# Patient Record
Sex: FEMALE | Race: ASIAN | NOT HISPANIC OR LATINO | Employment: FULL TIME | URBAN - METROPOLITAN AREA
[De-identification: names, ages, dates, MRNs, and addresses within clinical notes are randomized per-mention and may not be internally consistent; named-entity substitution may affect disease eponyms.]

---

## 2022-06-01 LAB
ABO + RH BLD: NORMAL
HCT VFR BLD AUTO: 33 % (ref 36–46)
HGB BLD-MCNC: 11 G/DL (ref 12–16)
INDIRECT COOMBS: NORMAL
PLATELET # BLD AUTO: 199 K/UL (ref 150–399)

## 2022-06-28 LAB
C TRACH RRNA SPEC QL PROBE: NORMAL
HBV SURFACE AG SERPL QL IA: POSITIVE
HIV 1+2 AB+HIV1 P24 AG SERPL QL IA: NORMAL
N GONORRHOEAE, AMPLIFIED DNA: NORMAL
RUBELLA IMMUNE STATUS: NORMAL

## 2022-09-22 ENCOUNTER — INITIAL PRENATAL (OUTPATIENT)
Dept: OBSTETRICS AND GYNECOLOGY | Facility: CLINIC | Age: 32
End: 2022-09-22
Payer: COMMERCIAL

## 2022-09-22 VITALS — DIASTOLIC BLOOD PRESSURE: 64 MMHG | SYSTOLIC BLOOD PRESSURE: 98 MMHG | HEART RATE: 104 BPM | WEIGHT: 136 LBS

## 2022-09-22 DIAGNOSIS — B19.10 HEP B COMPLICATING PREGNANCY: ICD-10-CM

## 2022-09-22 DIAGNOSIS — Z3A.26 26 WEEKS GESTATION OF PREGNANCY: Primary | ICD-10-CM

## 2022-09-22 DIAGNOSIS — O35.EXX0 ENCOUNTER FOR REPEAT ULTRASOUND OF FETAL PYELECTASIS, ANTEPARTUM, SINGLE OR UNSPECIFIED FETUS: ICD-10-CM

## 2022-09-22 DIAGNOSIS — O98.419 HEP B COMPLICATING PREGNANCY: ICD-10-CM

## 2022-09-22 DIAGNOSIS — A60.00 GENITAL HERPES SIMPLEX, UNSPECIFIED SITE: ICD-10-CM

## 2022-09-22 PROBLEM — Z34.90 PREGNANT: Status: ACTIVE | Noted: 2022-09-22

## 2022-09-22 PROCEDURE — 0502F SUBSEQUENT PRENATAL CARE: CPT | Mod: CPTII,S$GLB,, | Performed by: OBSTETRICS & GYNECOLOGY

## 2022-09-22 PROCEDURE — 99999 PR PBB SHADOW E&M-NEW PATIENT-LVL II: CPT | Mod: PBBFAC,,, | Performed by: OBSTETRICS & GYNECOLOGY

## 2022-09-22 PROCEDURE — 0502F PR SUBSEQUENT PRENATAL CARE: ICD-10-PCS | Mod: CPTII,S$GLB,, | Performed by: OBSTETRICS & GYNECOLOGY

## 2022-09-22 PROCEDURE — 99999 PR PBB SHADOW E&M-NEW PATIENT-LVL II: ICD-10-PCS | Mod: PBBFAC,,, | Performed by: OBSTETRICS & GYNECOLOGY

## 2022-09-22 RX ORDER — VALACYCLOVIR HYDROCHLORIDE 500 MG/1
500 TABLET, FILM COATED ORAL 2 TIMES DAILY
Qty: 10 TABLET | Refills: 12 | Status: SHIPPED | OUTPATIENT
Start: 2022-09-22 | End: 2022-11-20

## 2022-09-22 NOTE — PROGRESS NOTES
TRANSFER FROM [place].HAD HSV OUTBREAK LAST WEEK. HAS HEP B.  NP 3 WEEKS / ME 5 WEEKS / TDAP / MFM SCAN / DM / CBC / CMP / HEP B QUANT

## 2022-09-26 ENCOUNTER — PATIENT MESSAGE (OUTPATIENT)
Dept: MATERNAL FETAL MEDICINE | Facility: CLINIC | Age: 32
End: 2022-09-26
Payer: COMMERCIAL

## 2022-09-27 ENCOUNTER — PATIENT MESSAGE (OUTPATIENT)
Dept: MATERNAL FETAL MEDICINE | Facility: CLINIC | Age: 32
End: 2022-09-27
Payer: COMMERCIAL

## 2022-09-28 ENCOUNTER — OFFICE VISIT (OUTPATIENT)
Dept: MATERNAL FETAL MEDICINE | Facility: CLINIC | Age: 32
End: 2022-09-28
Payer: COMMERCIAL

## 2022-09-28 ENCOUNTER — PROCEDURE VISIT (OUTPATIENT)
Dept: MATERNAL FETAL MEDICINE | Facility: CLINIC | Age: 32
End: 2022-09-28
Payer: COMMERCIAL

## 2022-09-28 DIAGNOSIS — Z36.89 ENCOUNTER FOR ULTRASOUND TO ASSESS FETAL GROWTH: Primary | ICD-10-CM

## 2022-09-28 DIAGNOSIS — O35.EXX0 ENCOUNTER FOR REPEAT ULTRASOUND OF FETAL PYELECTASIS, ANTEPARTUM, SINGLE OR UNSPECIFIED FETUS: ICD-10-CM

## 2022-09-28 DIAGNOSIS — Z36.2 ENCOUNTER FOR FOLLOW-UP ULTRASOUND OF FETAL ANATOMY: ICD-10-CM

## 2022-09-28 PROBLEM — O40.9XX0 POLYHYDRAMNIOS, ANTEPARTUM: Status: ACTIVE | Noted: 2022-09-28

## 2022-09-28 PROCEDURE — 99214 PR OFFICE/OUTPT VISIT, EST, LEVL IV, 30-39 MIN: ICD-10-PCS | Mod: 25,S$GLB,, | Performed by: OBSTETRICS & GYNECOLOGY

## 2022-09-28 PROCEDURE — 76811 OB US DETAILED SNGL FETUS: CPT | Mod: S$GLB,,, | Performed by: OBSTETRICS & GYNECOLOGY

## 2022-09-28 PROCEDURE — 76811 US MFM PROCEDURE (VIEWPOINT): ICD-10-PCS | Mod: S$GLB,,, | Performed by: OBSTETRICS & GYNECOLOGY

## 2022-09-28 PROCEDURE — 99999 PR PBB SHADOW E&M-EST. PATIENT-LVL I: CPT | Mod: PBBFAC,,, | Performed by: OBSTETRICS & GYNECOLOGY

## 2022-09-28 PROCEDURE — 99999 PR PBB SHADOW E&M-EST. PATIENT-LVL I: ICD-10-PCS | Mod: PBBFAC,,, | Performed by: OBSTETRICS & GYNECOLOGY

## 2022-09-28 PROCEDURE — 99214 OFFICE O/P EST MOD 30 MIN: CPT | Mod: 25,S$GLB,, | Performed by: OBSTETRICS & GYNECOLOGY

## 2022-09-28 NOTE — ASSESSMENT & PLAN NOTE
Identified on 20w anatomy US, R 6.3 mm and L 5.8 mm at [facility] ([facility])  27w US with R 7.4 mm and L 5.4 mm, consistent with UTD A2-3, without evidence of calyceal involvement  Normal chromosomal screening results through [place] [facility] per records from [facility](unclear which specific test)    Based on the prenatal rating system proposed by Kiarra et al in the Journal of Pediatric Urology (2014), we grade urinary tract dilation in a manner that correlates with long-term risk (UTD A1 or A2-3). By this criteria, her measurement of 7.4 mm without other abnormal findings puts her fetus in the moderate risk category, A2-3. The cause of urinary tract dilation is most often physiologic, particularly for fetuses in the low risk category.  Other causes can include anatomic obstructions, with a prevalence ranging between 5 and 30% of diagnosed fetuses. There is a small association with trisomy 21, with a relative risk of 1.6, though this does not meaningfully modify her negative chromosome screening to date.    Recommendations:  1. Follow up US at 32 weeks  -  follow-up will depend on that measurement.  If the dilation persists, most often neonates are followed up in the first few weeks of life.  2. Fetal UTD does not typically alter place, timing or mode of delivery

## 2022-09-28 NOTE — PROGRESS NOTES
MATERNAL-FETAL MEDICINE   CONSULT NOTE    Provider requesting consultation: Dr. Kent    SUBJECTIVE:     Ms. Michelle Hernandez is a 32 y.o.  female with IUP at 27w1d who is seen in consultation by MFM for evaluation and management of:  Problem   Fetal pyelectasis          Medication List with Changes/Refills   Current Medications    VALACYCLOVIR (VALTREX) 500 MG TABLET    Take 1 tablet (500 mg total) by mouth 2 (two) times daily. for 5 days     Review of patient's allergies indicates:  No Known Allergies    PMH:  Past Medical History:   Diagnosis Date    Abnormal Pap smear of cervix     Herpes simplex virus (HSV) infection        PObHx:  OB History    Para Term  AB Living   2             SAB IAB Ectopic Multiple Live Births                  # Outcome Date GA Lbr Hang/2nd Weight Sex Delivery Anes PTL Lv   2 Current            1                 PSH:No past surgical history.    Family history:family history includes Colon cancer in her maternal grandmother.    Social history: reports that she has never smoked. She has never used smokeless tobacco. She reports that she does not currently use alcohol. She reports that she does not use drugs.    Genetic history: The patient denies any inherited genetic diseases or birth defects in herself or her partner's personal history or family.    Objective:   LMP 2022     Ultrasound performed. See viewpoint for full ultrasound report.  1. A routine anomaly screen was extended to a detailed anatomy survey secondary to the following finding: bilateral UTD    2. Bilateral renal pelvis dilation was identified on ultrasound today, measuring 7.3 mm (right) and 5.4 mm (left) and consistent with UTD A2-3 (moderate risk). This is a common and typically benign finding during the second trimester, occurring in 1 in 20 fetuses. However, it may herald the onset of true renal tract obstruction or reflux. Follow-up sonography at 32 - 34 weeks is recommended to determine need  for   evaluation. Targeted fetal anatomic views reveal no other fetal abnormalities though fetal imaging remains incomplete. Neither major structural abnormalities nor soft markers associated with Down Syndrome were seen. The patient is at low risk for Down Syndrome based on negative reported screening. Because the association between isolated renal pelvis dilation and Down Syndrome is weak or uncertain, no further testing for Down Syndrome is recommended. No counseling regarding renal pelvis dilation and Down Syndrome was performed today.   3. Fetal size today is consistent with established gestational age.  4. Cervical length is normal.  5. Placental location is anterior without evidence of previa.  6. Mild polyhydramnios (DANNIELLE < 30 cm) is identified on today's exam. Mild polyhydramnios is generally not an indicator of a significant abnormality of amniotic fluid volume.     Significant labs/imaging:  Aneuploidy screening: negative per [facility]    ASSESSMENT/PLAN:     32 y.o.  female with IUP at 27w1d     Fetal pyelectasis  Identified on 20w anatomy US, R 6.3 mm and L 5.8 mm at [facility] ([facility])  27w US with R 7.4 mm and L 5.4 mm, consistent with UTD A2-3, without evidence of calyceal involvement  Normal chromosomal screening results through [place] [facility] per records from [facility](unclear which specific test)    Based on the prenatal rating system proposed by Kiarra et al in the Journal of Pediatric Urology (2014), we grade urinary tract dilation in a manner that correlates with long-term risk (UTD A1 or A2-3). By this criteria, her measurement of 7.4 mm without other abnormal findings puts her fetus in the moderate risk category, A2-3. The cause of urinary tract dilation is most often physiologic, particularly for fetuses in the low risk category.  Other causes can include anatomic obstructions, with a prevalence ranging between 5 and 30% of diagnosed fetuses. There is  a small association with trisomy 21, with a relative risk of 1.6, though this does not meaningfully modify her negative chromosome screening to date.    Recommendations:  1. Follow up US at 32 weeks  -  follow-up will depend on that measurement.  If the dilation persists, most often neonates are followed up in the first few weeks of life.  2. Fetal UTD does not typically alter place, timing or mode of delivery    Patient's other comorbidites are chronic hepatitis B and HSV.  These were not addressed in today's visit.  If primary OB provider would like MFM input on these, please feel free to reconsult as clinically indicated.  Otherwise, will defer management to primary OB provider      FOLLOW UP:   F/u in 4 weeks for US  No further MFM visits were scheduled        Vladimir Silverman III  Maternal-Fetal Medicine    Electronically Signed by Vladimir Silverman III 2022

## 2022-10-04 ENCOUNTER — PATIENT MESSAGE (OUTPATIENT)
Dept: ADMINISTRATIVE | Facility: OTHER | Age: 32
End: 2022-10-04
Payer: COMMERCIAL

## 2022-10-14 ENCOUNTER — CLINICAL SUPPORT (OUTPATIENT)
Dept: OBSTETRICS AND GYNECOLOGY | Facility: CLINIC | Age: 32
End: 2022-10-14
Payer: COMMERCIAL

## 2022-10-14 ENCOUNTER — PATIENT MESSAGE (OUTPATIENT)
Dept: OBSTETRICS AND GYNECOLOGY | Facility: CLINIC | Age: 32
End: 2022-10-14

## 2022-10-14 ENCOUNTER — LAB VISIT (OUTPATIENT)
Dept: LAB | Facility: OTHER | Age: 32
End: 2022-10-14
Attending: OBSTETRICS & GYNECOLOGY
Payer: COMMERCIAL

## 2022-10-14 ENCOUNTER — ROUTINE PRENATAL (OUTPATIENT)
Dept: OBSTETRICS AND GYNECOLOGY | Facility: CLINIC | Age: 32
End: 2022-10-14
Payer: COMMERCIAL

## 2022-10-14 VITALS
BODY MASS INDEX: 26.55 KG/M2 | WEIGHT: 140.63 LBS | DIASTOLIC BLOOD PRESSURE: 62 MMHG | HEIGHT: 61 IN | SYSTOLIC BLOOD PRESSURE: 104 MMHG

## 2022-10-14 DIAGNOSIS — Z23 NEED FOR TDAP VACCINATION: Primary | ICD-10-CM

## 2022-10-14 DIAGNOSIS — Z3A.29 29 WEEKS GESTATION OF PREGNANCY: Primary | ICD-10-CM

## 2022-10-14 DIAGNOSIS — Z3A.26 26 WEEKS GESTATION OF PREGNANCY: ICD-10-CM

## 2022-10-14 LAB
ALBUMIN SERPL BCP-MCNC: 2.7 G/DL (ref 3.5–5.2)
ALP SERPL-CCNC: 74 U/L (ref 55–135)
ALT SERPL W/O P-5'-P-CCNC: 21 U/L (ref 10–44)
ANION GAP SERPL CALC-SCNC: 3 MMOL/L (ref 8–16)
AST SERPL-CCNC: 22 U/L (ref 10–40)
BASOPHILS # BLD AUTO: 0.03 K/UL (ref 0–0.2)
BASOPHILS NFR BLD: 0.3 % (ref 0–1.9)
BILIRUB SERPL-MCNC: 0.2 MG/DL (ref 0.1–1)
BUN SERPL-MCNC: 5 MG/DL (ref 6–20)
CALCIUM SERPL-MCNC: 8.2 MG/DL (ref 8.7–10.5)
CHLORIDE SERPL-SCNC: 108 MMOL/L (ref 95–110)
CO2 SERPL-SCNC: 24 MMOL/L (ref 23–29)
CREAT SERPL-MCNC: 0.6 MG/DL (ref 0.5–1.4)
DIFFERENTIAL METHOD: ABNORMAL
EOSINOPHIL # BLD AUTO: 0.1 K/UL (ref 0–0.5)
EOSINOPHIL NFR BLD: 1.4 % (ref 0–8)
ERYTHROCYTE [DISTWIDTH] IN BLOOD BY AUTOMATED COUNT: 12.6 % (ref 11.5–14.5)
EST. GFR  (NO RACE VARIABLE): >60 ML/MIN/1.73 M^2
GLUCOSE SERPL-MCNC: 120 MG/DL (ref 70–110)
GLUCOSE SERPL-MCNC: 120 MG/DL (ref 70–140)
HCT VFR BLD AUTO: 28.9 % (ref 37–48.5)
HGB BLD-MCNC: 9.9 G/DL (ref 12–16)
IMM GRANULOCYTES # BLD AUTO: 0.08 K/UL (ref 0–0.04)
IMM GRANULOCYTES NFR BLD AUTO: 0.9 % (ref 0–0.5)
LYMPHOCYTES # BLD AUTO: 1.7 K/UL (ref 1–4.8)
LYMPHOCYTES NFR BLD: 18.8 % (ref 18–48)
MCH RBC QN AUTO: 34.4 PG (ref 27–31)
MCHC RBC AUTO-ENTMCNC: 34.3 G/DL (ref 32–36)
MCV RBC AUTO: 100 FL (ref 82–98)
MONOCYTES # BLD AUTO: 0.9 K/UL (ref 0.3–1)
MONOCYTES NFR BLD: 10.4 % (ref 4–15)
NEUTROPHILS # BLD AUTO: 6.1 K/UL (ref 1.8–7.7)
NEUTROPHILS NFR BLD: 68.2 % (ref 38–73)
NRBC BLD-RTO: 0 /100 WBC
PLATELET # BLD AUTO: 198 K/UL (ref 150–450)
PMV BLD AUTO: 9.6 FL (ref 9.2–12.9)
POTASSIUM SERPL-SCNC: 3.6 MMOL/L (ref 3.5–5.1)
PROT SERPL-MCNC: 6.1 G/DL (ref 6–8.4)
RBC # BLD AUTO: 2.88 M/UL (ref 4–5.4)
SODIUM SERPL-SCNC: 135 MMOL/L (ref 136–145)
WBC # BLD AUTO: 9.01 K/UL (ref 3.9–12.7)

## 2022-10-14 PROCEDURE — 90715 TDAP VACCINE GREATER THAN OR EQUAL TO 7YO IM: ICD-10-PCS | Mod: S$GLB,,, | Performed by: OBSTETRICS & GYNECOLOGY

## 2022-10-14 PROCEDURE — 80053 COMPREHEN METABOLIC PANEL: CPT | Performed by: OBSTETRICS & GYNECOLOGY

## 2022-10-14 PROCEDURE — 99999 PR PBB SHADOW E&M-EST. PATIENT-LVL I: CPT | Mod: PBBFAC,,,

## 2022-10-14 PROCEDURE — 0502F SUBSEQUENT PRENATAL CARE: CPT | Mod: CPTII,S$GLB,, | Performed by: NURSE PRACTITIONER

## 2022-10-14 PROCEDURE — 0502F PR SUBSEQUENT PRENATAL CARE: ICD-10-PCS | Mod: CPTII,S$GLB,, | Performed by: NURSE PRACTITIONER

## 2022-10-14 PROCEDURE — 99999 PR PBB SHADOW E&M-EST. PATIENT-LVL I: ICD-10-PCS | Mod: PBBFAC,,,

## 2022-10-14 PROCEDURE — 90715 TDAP VACCINE 7 YRS/> IM: CPT | Mod: S$GLB,,, | Performed by: OBSTETRICS & GYNECOLOGY

## 2022-10-14 PROCEDURE — 36415 COLL VENOUS BLD VENIPUNCTURE: CPT | Performed by: OBSTETRICS & GYNECOLOGY

## 2022-10-14 PROCEDURE — 82950 GLUCOSE TEST: CPT | Performed by: OBSTETRICS & GYNECOLOGY

## 2022-10-14 PROCEDURE — 99999 PR PBB SHADOW E&M-EST. PATIENT-LVL III: ICD-10-PCS | Mod: PBBFAC,,, | Performed by: NURSE PRACTITIONER

## 2022-10-14 PROCEDURE — 90471 IMMUNIZATION ADMIN: CPT | Mod: S$GLB,,, | Performed by: OBSTETRICS & GYNECOLOGY

## 2022-10-14 PROCEDURE — 90471 TDAP VACCINE GREATER THAN OR EQUAL TO 7YO IM: ICD-10-PCS | Mod: S$GLB,,, | Performed by: OBSTETRICS & GYNECOLOGY

## 2022-10-14 PROCEDURE — 85025 COMPLETE CBC W/AUTO DIFF WBC: CPT | Performed by: OBSTETRICS & GYNECOLOGY

## 2022-10-14 PROCEDURE — 99999 PR PBB SHADOW E&M-EST. PATIENT-LVL III: CPT | Mod: PBBFAC,,, | Performed by: NURSE PRACTITIONER

## 2022-10-14 NOTE — PATIENT INSTRUCTIONS
LABOR AND DELIVERY PHONE NUMBER, [phone] (OPEN 24/7, LOCATED ON 6TH FLOOR OF HOSPITAL)  [address] PHONE NUMBER, [phone] (OPEN MON-FRI, 8a-5p)

## 2022-10-14 NOTE — PROGRESS NOTES
Here for routine OB appt at 29w3d, with no complaints. Feels good. Having a girl- name undecided. Interested in hospital tour. Lives close to [facility]. Questions about due date- us here says 12/27, but it was always 12/28/22. Reports good FM.  Denies LOF, denies VB, denies contractions.  Reviewed warning signs of Labor and Preeclampsia.  Daily FM counts reinforced.  F/U scheduled 2 weeks  Tdap and glucose today  Rh positive   Gave [facility] peds list and prenatal classes information  Reviewed [facility] location and phone number  Plans to breast feed

## 2022-10-18 PROBLEM — D64.9 ANEMIA: Status: ACTIVE | Noted: 2022-10-18

## 2022-10-20 ENCOUNTER — ROUTINE PRENATAL (OUTPATIENT)
Dept: OBSTETRICS AND GYNECOLOGY | Facility: CLINIC | Age: 32
End: 2022-10-20
Payer: COMMERCIAL

## 2022-10-20 VITALS — SYSTOLIC BLOOD PRESSURE: 93 MMHG | BODY MASS INDEX: 26.66 KG/M2 | WEIGHT: 141.13 LBS | DIASTOLIC BLOOD PRESSURE: 56 MMHG

## 2022-10-20 DIAGNOSIS — Z3A.30 PREGNANCY WITH 30 COMPLETED WEEKS GESTATION: Primary | ICD-10-CM

## 2022-10-20 PROCEDURE — 99999 PR PBB SHADOW E&M-EST. PATIENT-LVL II: CPT | Mod: PBBFAC,,, | Performed by: OBSTETRICS & GYNECOLOGY

## 2022-10-20 PROCEDURE — 0502F SUBSEQUENT PRENATAL CARE: CPT | Mod: CPTII,S$GLB,, | Performed by: OBSTETRICS & GYNECOLOGY

## 2022-10-20 PROCEDURE — 99999 PR PBB SHADOW E&M-EST. PATIENT-LVL II: ICD-10-PCS | Mod: PBBFAC,,, | Performed by: OBSTETRICS & GYNECOLOGY

## 2022-10-20 PROCEDURE — 0502F PR SUBSEQUENT PRENATAL CARE: ICD-10-PCS | Mod: CPTII,S$GLB,, | Performed by: OBSTETRICS & GYNECOLOGY

## 2022-10-31 ENCOUNTER — TELEPHONE (OUTPATIENT)
Dept: OBSTETRICS AND GYNECOLOGY | Facility: CLINIC | Age: 32
End: 2022-10-31
Payer: COMMERCIAL

## 2022-10-31 NOTE — TELEPHONE ENCOUNTER
Spoke with pt in regards to appt.     ----- Message from Nathaniel Torres RN sent at 10/31/2022  2:47 PM CDT -----  Patient is scheduled for 11/7  ----- Message -----  From: Belkis Canseco MA  Sent: 10/31/2022   1:34 PM CDT  To: [facility] Maternal Fetal Medicine Staff    Hi-    Do you gubrad have an available time on 11/7?    Thank you,  Belkis     ----- Message -----  From: Lorne Hill  Sent: 10/31/2022  12:17 PM CDT  To: [facility] Staff    Name of Who is Calling: CHERRIE BURNS [[id number]]           What is the request in detail: Patient is requesting a call back to reschedule upcoming ob appointment and ultrasound to Monday November 7.  Please assist.           Can the clinic reply by [facility]: NO           What Number to Call Back if not in [facility]: [phone]

## 2022-11-02 ENCOUNTER — PATIENT MESSAGE (OUTPATIENT)
Dept: ADMINISTRATIVE | Facility: OTHER | Age: 32
End: 2022-11-02
Payer: COMMERCIAL

## 2022-11-04 ENCOUNTER — PATIENT MESSAGE (OUTPATIENT)
Dept: MATERNAL FETAL MEDICINE | Facility: CLINIC | Age: 32
End: 2022-11-04
Payer: COMMERCIAL

## 2022-11-07 ENCOUNTER — PROCEDURE VISIT (OUTPATIENT)
Dept: MATERNAL FETAL MEDICINE | Facility: CLINIC | Age: 32
End: 2022-11-07
Payer: COMMERCIAL

## 2022-11-07 DIAGNOSIS — Z36.2 ENCOUNTER FOR FOLLOW-UP ULTRASOUND OF FETAL ANATOMY: ICD-10-CM

## 2022-11-07 DIAGNOSIS — O35.EXX0 ENCOUNTER FOR REPEAT ULTRASOUND OF FETAL PYELECTASIS, ANTEPARTUM, SINGLE OR UNSPECIFIED FETUS: ICD-10-CM

## 2022-11-07 DIAGNOSIS — Z36.89 ENCOUNTER FOR ULTRASOUND TO ASSESS FETAL GROWTH: ICD-10-CM

## 2022-11-07 DIAGNOSIS — Z3A.32 32 WEEKS GESTATION OF PREGNANCY: Primary | ICD-10-CM

## 2022-11-07 PROCEDURE — 76816 OB US FOLLOW-UP PER FETUS: CPT | Mod: S$GLB,,, | Performed by: OBSTETRICS & GYNECOLOGY

## 2022-11-07 PROCEDURE — 76816 PR  US,PREGNANT UTERUS,F/U,TRANSABD APP: ICD-10-PCS | Mod: S$GLB,,, | Performed by: OBSTETRICS & GYNECOLOGY

## 2022-11-14 ENCOUNTER — ROUTINE PRENATAL (OUTPATIENT)
Dept: OBSTETRICS AND GYNECOLOGY | Facility: CLINIC | Age: 32
End: 2022-11-14
Payer: COMMERCIAL

## 2022-11-14 VITALS — DIASTOLIC BLOOD PRESSURE: 66 MMHG | WEIGHT: 142 LBS | BODY MASS INDEX: 26.83 KG/M2 | SYSTOLIC BLOOD PRESSURE: 108 MMHG

## 2022-11-14 DIAGNOSIS — Z3A.33 33 WEEKS GESTATION OF PREGNANCY: Primary | ICD-10-CM

## 2022-11-14 DIAGNOSIS — L29.9 ITCHING: ICD-10-CM

## 2022-11-14 PROCEDURE — 99999 PR PBB SHADOW E&M-EST. PATIENT-LVL II: ICD-10-PCS | Mod: PBBFAC,,,

## 2022-11-14 PROCEDURE — 0502F PR SUBSEQUENT PRENATAL CARE: ICD-10-PCS | Mod: CPTII,S$GLB,,

## 2022-11-14 PROCEDURE — 99999 PR PBB SHADOW E&M-EST. PATIENT-LVL II: CPT | Mod: PBBFAC,,,

## 2022-11-14 PROCEDURE — 0502F SUBSEQUENT PRENATAL CARE: CPT | Mod: CPTII,S$GLB,,

## 2022-11-14 NOTE — PROGRESS NOTES
Here for routine OB appt at 33w5d, with no complaints. Reports good FM. Denies LOF, denies VB, denies contractions. Does report increased itching of her abdomen for the past month. States she's tried hydrocortisone cream without much relief. Denies itching of hands and feet. Labs ordered.   Discussed [facility] pediatricians.   Follow up ultrasound scheduled.   Reviewed warning signs of Labor and Preeclampsia. Daily FM counts reinforced.  F/U scheduled 2 weeks with Dr. Kent

## 2022-11-16 ENCOUNTER — LAB VISIT (OUTPATIENT)
Dept: LAB | Facility: OTHER | Age: 32
End: 2022-11-16
Attending: OBSTETRICS & GYNECOLOGY
Payer: COMMERCIAL

## 2022-11-16 DIAGNOSIS — O98.419 HEP B COMPLICATING PREGNANCY: ICD-10-CM

## 2022-11-16 DIAGNOSIS — B19.10 HEP B COMPLICATING PREGNANCY: ICD-10-CM

## 2022-11-16 DIAGNOSIS — L29.9 ITCHING: ICD-10-CM

## 2022-11-16 LAB
ALBUMIN SERPL BCP-MCNC: 2.8 G/DL (ref 3.5–5.2)
ALP SERPL-CCNC: 103 U/L (ref 55–135)
ALT SERPL W/O P-5'-P-CCNC: 15 U/L (ref 10–44)
ANION GAP SERPL CALC-SCNC: 6 MMOL/L (ref 8–16)
AST SERPL-CCNC: 21 U/L (ref 10–40)
BILIRUB SERPL-MCNC: 0.3 MG/DL (ref 0.1–1)
BUN SERPL-MCNC: 5 MG/DL (ref 6–20)
CALCIUM SERPL-MCNC: 8.6 MG/DL (ref 8.7–10.5)
CHLORIDE SERPL-SCNC: 107 MMOL/L (ref 95–110)
CO2 SERPL-SCNC: 24 MMOL/L (ref 23–29)
CREAT SERPL-MCNC: 0.6 MG/DL (ref 0.5–1.4)
EST. GFR  (NO RACE VARIABLE): >60 ML/MIN/1.73 M^2
GLUCOSE SERPL-MCNC: 69 MG/DL (ref 70–110)
POTASSIUM SERPL-SCNC: 3.8 MMOL/L (ref 3.5–5.1)
PROT SERPL-MCNC: 6.2 G/DL (ref 6–8.4)
SODIUM SERPL-SCNC: 137 MMOL/L (ref 136–145)

## 2022-11-16 PROCEDURE — 82239 BILE ACIDS TOTAL: CPT

## 2022-11-16 PROCEDURE — 36415 COLL VENOUS BLD VENIPUNCTURE: CPT

## 2022-11-16 PROCEDURE — 87517 HEPATITIS B DNA QUANT: CPT | Performed by: OBSTETRICS & GYNECOLOGY

## 2022-11-16 PROCEDURE — 80053 COMPREHEN METABOLIC PANEL: CPT

## 2022-11-18 ENCOUNTER — PATIENT MESSAGE (OUTPATIENT)
Dept: OBSTETRICS AND GYNECOLOGY | Facility: CLINIC | Age: 32
End: 2022-11-18

## 2022-11-18 ENCOUNTER — OFFICE VISIT (OUTPATIENT)
Dept: OBSTETRICS AND GYNECOLOGY | Facility: CLINIC | Age: 32
End: 2022-11-18
Payer: COMMERCIAL

## 2022-11-18 VITALS
DIASTOLIC BLOOD PRESSURE: 56 MMHG | BODY MASS INDEX: 27.72 KG/M2 | WEIGHT: 146.81 LBS | HEIGHT: 61 IN | SYSTOLIC BLOOD PRESSURE: 98 MMHG

## 2022-11-18 DIAGNOSIS — Z3A.34 34 WEEKS GESTATION OF PREGNANCY: ICD-10-CM

## 2022-11-18 DIAGNOSIS — B00.9 HSV INFECTION: Primary | ICD-10-CM

## 2022-11-18 LAB — BILE AC SERPL-SCNC: 10 MCMOL/L

## 2022-11-18 PROCEDURE — 99214 OFFICE O/P EST MOD 30 MIN: CPT | Mod: 24,S$GLB,,

## 2022-11-18 PROCEDURE — 99214 PR OFFICE/OUTPT VISIT, EST, LEVL IV, 30-39 MIN: ICD-10-PCS | Mod: 24,S$GLB,,

## 2022-11-18 PROCEDURE — 99999 PR PBB SHADOW E&M-EST. PATIENT-LVL III: ICD-10-PCS | Mod: PBBFAC,,,

## 2022-11-18 PROCEDURE — 99999 PR PBB SHADOW E&M-EST. PATIENT-LVL III: CPT | Mod: PBBFAC,,,

## 2022-11-18 NOTE — PROGRESS NOTES
"CC: vaginal rash     HPI:  Michelle Hernandez is a 32 y.o. female  @ 34w2d presents with complaint of vaginal rash for the past few days. Pt does have a hx of HSV. States that the rash is burning with some itching. Cannot fully see if it looks similar to HSV. Does report that she's had shingles in the past. Is wanting to get the shingles vaccine.     Past Medical History:   Diagnosis Date    Abnormal Pap smear of cervix     Herpes simplex virus (HSV) infection      History reviewed. No pertinent surgical history.  Social History     Tobacco Use    Smoking status: Never    Smokeless tobacco: Never   Substance Use Topics    Alcohol use: Not Currently    Drug use: Never     Family History   Problem Relation Age of Onset    Colon cancer Maternal Grandmother     Breast cancer Neg Hx     Ovarian cancer Neg Hx      OB History    Para Term  AB Living   2             SAB IAB Ectopic Multiple Live Births                  # Outcome Date GA Lbr Hang/2nd Weight Sex Delivery Anes PTL Lv   2 Current            1                 BP (!) 98/56 (BP Location: Right arm, Patient Position: Sitting)   Ht 5' 1" (1.549 m)   Wt 66.6 kg (146 lb 13.2 oz)   LMP 2022   BMI 27.74 kg/m²     ROS:  GENERAL: No fever, chills, fatigability or weight loss.  MONS PUBIS: reports itching and burning   VULVAR: No pain   VAGINAL: No relaxation, no itching, no discharge, no abnormal bleeding and no lesions.  ABDOMEN: No abdominal pain. Denies nausea. Denies vomiting. No diarrhea. No constipation  BREAST: Denies pain. No lumps. No discharge.  URINARY: No incontinence, no nocturia, no frequency and no dysuria.  CARDIOVASCULAR: No chest pain. No shortness of breath. No leg cramps.  NEUROLOGICAL: No headaches. No vision changes.    PHYSICAL EXAM:  MONS PUBIS: Small 1 cm outbreak of HSV noted   VULVA: normal appearing vulva with no masses, tenderness or lesions   VAGINA: normal appearing vagina with normal color.     ASSESSMENT and " PLAN:    ICD-10-CM ICD-9-CM    1. HSV infection  B00.9 054.9       2. 34 weeks gestation of pregnancy  Z3A.34 V22.2         - Discussed that the rash is HSV   - Instructed patient to start taking Valtrex RX  - Already has RX for valtrex at pharmacy, just needs to  refill     FOLLOW UP: PRN lack of improvement.    Rita Roa, NP-C  [facility]

## 2022-11-20 ENCOUNTER — PATIENT MESSAGE (OUTPATIENT)
Dept: OBSTETRICS AND GYNECOLOGY | Facility: CLINIC | Age: 32
End: 2022-11-20
Payer: COMMERCIAL

## 2022-11-20 RX ORDER — VALACYCLOVIR HYDROCHLORIDE 500 MG/1
500 TABLET, FILM COATED ORAL 2 TIMES DAILY
Qty: 10 TABLET | Refills: 12 | Status: SHIPPED | OUTPATIENT
Start: 2022-11-20 | End: 2022-12-28

## 2022-11-21 LAB
HBV DNA SERPL NAA+PROBE-ACNC: 57 IU/ML
HBV DNA SERPL NAA+PROBE-LOG IU: 1.76 LOG (10) IU/ML
HBV DNA SERPL QL NAA+PROBE: DETECTED

## 2022-11-29 ENCOUNTER — TELEPHONE (OUTPATIENT)
Dept: OBSTETRICS AND GYNECOLOGY | Facility: CLINIC | Age: 32
End: 2022-11-29
Payer: COMMERCIAL

## 2022-11-29 NOTE — TELEPHONE ENCOUNTER
----- Message from Ronna Kumari sent at 11/29/2022 10:18 AM CST -----  Pt called in to speak with the nurse in regards to her appointment today. The caller states she was asked to come in early , but is unable to. The pt declined to see a np or another provider. Pls call and advise.

## 2022-12-06 ENCOUNTER — LAB VISIT (OUTPATIENT)
Dept: LAB | Facility: OTHER | Age: 32
End: 2022-12-06
Payer: COMMERCIAL

## 2022-12-06 ENCOUNTER — ROUTINE PRENATAL (OUTPATIENT)
Dept: OBSTETRICS AND GYNECOLOGY | Facility: CLINIC | Age: 32
End: 2022-12-06
Payer: COMMERCIAL

## 2022-12-06 ENCOUNTER — PROCEDURE VISIT (OUTPATIENT)
Dept: MATERNAL FETAL MEDICINE | Facility: CLINIC | Age: 32
End: 2022-12-06
Payer: COMMERCIAL

## 2022-12-06 VITALS — BODY MASS INDEX: 27.49 KG/M2 | WEIGHT: 145.5 LBS | SYSTOLIC BLOOD PRESSURE: 96 MMHG | DIASTOLIC BLOOD PRESSURE: 61 MMHG

## 2022-12-06 DIAGNOSIS — Z3A.36 36 WEEKS GESTATION OF PREGNANCY: ICD-10-CM

## 2022-12-06 DIAGNOSIS — Z3A.36 36 WEEKS GESTATION OF PREGNANCY: Primary | ICD-10-CM

## 2022-12-06 DIAGNOSIS — Z36.89 ENCOUNTER FOR ULTRASOUND TO ASSESS FETAL GROWTH: ICD-10-CM

## 2022-12-06 LAB — HIV 1+2 AB+HIV1 P24 AG SERPL QL IA: NORMAL

## 2022-12-06 PROCEDURE — 99999 PR PBB SHADOW E&M-EST. PATIENT-LVL II: ICD-10-PCS | Mod: PBBFAC,,,

## 2022-12-06 PROCEDURE — 86592 SYPHILIS TEST NON-TREP QUAL: CPT

## 2022-12-06 PROCEDURE — 76816 US MFM PROCEDURE (VIEWPOINT): ICD-10-PCS | Mod: S$GLB,,, | Performed by: OBSTETRICS & GYNECOLOGY

## 2022-12-06 PROCEDURE — 87389 HIV-1 AG W/HIV-1&-2 AB AG IA: CPT

## 2022-12-06 PROCEDURE — 99999 PR PBB SHADOW E&M-EST. PATIENT-LVL II: CPT | Mod: PBBFAC,,,

## 2022-12-06 PROCEDURE — 0502F SUBSEQUENT PRENATAL CARE: CPT | Mod: S$GLB,,,

## 2022-12-06 PROCEDURE — 87081 CULTURE SCREEN ONLY: CPT

## 2022-12-06 PROCEDURE — 76816 OB US FOLLOW-UP PER FETUS: CPT | Mod: S$GLB,,, | Performed by: OBSTETRICS & GYNECOLOGY

## 2022-12-06 PROCEDURE — 36415 COLL VENOUS BLD VENIPUNCTURE: CPT

## 2022-12-06 PROCEDURE — 0502F PR SUBSEQUENT PRENATAL CARE: ICD-10-PCS | Mod: S$GLB,,,

## 2022-12-06 NOTE — PROGRESS NOTES
Here for routine OB appt at 36w6d, with no complaints. Reports good FM.  Denies LOF, denies VB, denies contractions. Had HSV outbreak last time I saw her, mostly cleared up. Discussed taking valtrex daily for prophylaxis. Had MFM ultrasound today, results still pending.  - GBS today  - 3T labs today   Reviewed warning signs of Labor and Preeclampsia. Daily FM counts reinforced.  F/U scheduled 1 week with Dr. Kent

## 2022-12-07 LAB — RPR SER QL: NORMAL

## 2022-12-09 LAB — BACTERIA SPEC AEROBE CULT: NORMAL

## 2022-12-12 ENCOUNTER — ROUTINE PRENATAL (OUTPATIENT)
Dept: OBSTETRICS AND GYNECOLOGY | Facility: CLINIC | Age: 32
End: 2022-12-12
Payer: COMMERCIAL

## 2022-12-12 VITALS
BODY MASS INDEX: 27.91 KG/M2 | DIASTOLIC BLOOD PRESSURE: 58 MMHG | WEIGHT: 147.69 LBS | SYSTOLIC BLOOD PRESSURE: 102 MMHG

## 2022-12-12 DIAGNOSIS — Z3A.37 37 WEEKS GESTATION OF PREGNANCY: Primary | ICD-10-CM

## 2022-12-12 PROCEDURE — 99999 PR PBB SHADOW E&M-EST. PATIENT-LVL II: CPT | Mod: PBBFAC,,, | Performed by: OBSTETRICS & GYNECOLOGY

## 2022-12-12 PROCEDURE — 0502F SUBSEQUENT PRENATAL CARE: CPT | Mod: S$GLB,,, | Performed by: OBSTETRICS & GYNECOLOGY

## 2022-12-12 PROCEDURE — 0502F PR SUBSEQUENT PRENATAL CARE: ICD-10-PCS | Mod: S$GLB,,, | Performed by: OBSTETRICS & GYNECOLOGY

## 2022-12-12 PROCEDURE — 99999 PR PBB SHADOW E&M-EST. PATIENT-LVL II: ICD-10-PCS | Mod: PBBFAC,,, | Performed by: OBSTETRICS & GYNECOLOGY

## 2022-12-13 ENCOUNTER — OFFICE VISIT (OUTPATIENT)
Dept: PEDIATRICS | Facility: CLINIC | Age: 32
End: 2022-12-13
Payer: COMMERCIAL

## 2022-12-13 DIAGNOSIS — Z76.81 EXPECTANT PARENT PREBIRTH PEDIATRICIAN VISIT: Primary | ICD-10-CM

## 2022-12-13 PROCEDURE — 99999 PR PBB SHADOW E&M-EST. PATIENT-LVL I: CPT | Mod: PBBFAC,,, | Performed by: PEDIATRICS

## 2022-12-13 PROCEDURE — 99499 UNLISTED E&M SERVICE: CPT | Mod: S$GLB,,, | Performed by: PEDIATRICS

## 2022-12-13 PROCEDURE — 99999 PR PBB SHADOW E&M-EST. PATIENT-LVL I: ICD-10-PCS | Mod: PBBFAC,,, | Performed by: PEDIATRICS

## 2022-12-13 PROCEDURE — 99499 NO LOS: ICD-10-PCS | Mod: S$GLB,,, | Performed by: PEDIATRICS

## 2022-12-13 NOTE — PROGRESS NOTES
CC: Prenatal visit    Due date: 12/28  Gender: Girl  OB: Dr. Kent    Parental concerns or questions today:  general    [facility] practice care reviewed.  All questions answered.

## 2022-12-20 ENCOUNTER — TELEPHONE (OUTPATIENT)
Dept: OBSTETRICS AND GYNECOLOGY | Facility: CLINIC | Age: 32
End: 2022-12-20
Payer: COMMERCIAL

## 2022-12-20 ENCOUNTER — PATIENT MESSAGE (OUTPATIENT)
Dept: OBSTETRICS AND GYNECOLOGY | Facility: CLINIC | Age: 32
End: 2022-12-20
Payer: COMMERCIAL

## 2022-12-20 NOTE — TELEPHONE ENCOUNTER
Spoke with pt and informed her that MD is out ill. Pt made an appt w/ NP and a just incase with MD for next available.

## 2022-12-20 NOTE — TELEPHONE ENCOUNTER
----- Message from Tahira Silverman sent at 12/20/2022  2:00 PM CST -----  Pt would like to see Dr Kent before she go in. Her due date is 12/27. Pt can be reached at [phone]

## 2022-12-21 ENCOUNTER — ROUTINE PRENATAL (OUTPATIENT)
Dept: OBSTETRICS AND GYNECOLOGY | Facility: CLINIC | Age: 32
End: 2022-12-21
Payer: COMMERCIAL

## 2022-12-21 VITALS
SYSTOLIC BLOOD PRESSURE: 112 MMHG | BODY MASS INDEX: 28.33 KG/M2 | DIASTOLIC BLOOD PRESSURE: 69 MMHG | WEIGHT: 149.94 LBS

## 2022-12-21 DIAGNOSIS — Z3A.39 39 WEEKS GESTATION OF PREGNANCY: Primary | ICD-10-CM

## 2022-12-21 PROCEDURE — 99999 PR PBB SHADOW E&M-EST. PATIENT-LVL III: ICD-10-PCS | Mod: PBBFAC,,, | Performed by: NURSE PRACTITIONER

## 2022-12-21 PROCEDURE — 99999 PR PBB SHADOW E&M-EST. PATIENT-LVL III: CPT | Mod: PBBFAC,,, | Performed by: NURSE PRACTITIONER

## 2022-12-21 PROCEDURE — 0502F SUBSEQUENT PRENATAL CARE: CPT | Mod: S$GLB,,, | Performed by: NURSE PRACTITIONER

## 2022-12-21 PROCEDURE — 0502F PR SUBSEQUENT PRENATAL CARE: ICD-10-PCS | Mod: S$GLB,,, | Performed by: NURSE PRACTITIONER

## 2022-12-21 NOTE — PATIENT INSTRUCTIONS
LABOR AND DELIVERY PHONE NUMBER, [phone] (OPEN 24/7, LOCATED ON 6TH FLOOR OF HOSPITAL)  [address] PHONE NUMBER, [phone] (OPEN MON-FRI, 8a-5p)

## 2022-12-21 NOTE — PROGRESS NOTES
Here for routine OB appt at 39w0d, with no complaints.  Reports good FM.  Denies LOF, denies VB, denies contractions. Lives near hospital, plans to walk to hospital if possible. GIRL. Discussed when to go to hospital and [facility] location.  Reviewed warning signs of Labor and Preeclampsia.  Daily FM counts reinforced.  F/U scheduled 1 week  Repeat scan still showing mild poly- vertex on 12/7  GBS negative

## 2022-12-27 ENCOUNTER — ROUTINE PRENATAL (OUTPATIENT)
Dept: OBSTETRICS AND GYNECOLOGY | Facility: CLINIC | Age: 32
End: 2022-12-27
Payer: COMMERCIAL

## 2022-12-27 VITALS — WEIGHT: 150.13 LBS | BODY MASS INDEX: 28.37 KG/M2 | SYSTOLIC BLOOD PRESSURE: 98 MMHG | DIASTOLIC BLOOD PRESSURE: 56 MMHG

## 2022-12-27 DIAGNOSIS — Z3A.39 PREGNANCY WITH 39 COMPLETED WEEKS GESTATION: Primary | ICD-10-CM

## 2022-12-27 PROCEDURE — 0502F PR SUBSEQUENT PRENATAL CARE: ICD-10-PCS | Mod: S$GLB,,, | Performed by: OBSTETRICS & GYNECOLOGY

## 2022-12-27 PROCEDURE — 99999 PR PBB SHADOW E&M-EST. PATIENT-LVL III: ICD-10-PCS | Mod: PBBFAC,,, | Performed by: OBSTETRICS & GYNECOLOGY

## 2022-12-27 PROCEDURE — 99999 PR PBB SHADOW E&M-EST. PATIENT-LVL III: CPT | Mod: PBBFAC,,, | Performed by: OBSTETRICS & GYNECOLOGY

## 2022-12-27 PROCEDURE — 0502F SUBSEQUENT PRENATAL CARE: CPT | Mod: S$GLB,,, | Performed by: OBSTETRICS & GYNECOLOGY

## 2023-01-03 ENCOUNTER — ANESTHESIA EVENT (OUTPATIENT)
Dept: OBSTETRICS AND GYNECOLOGY | Facility: OTHER | Age: 33
End: 2023-01-03
Payer: COMMERCIAL

## 2023-01-03 ENCOUNTER — HOSPITAL ENCOUNTER (INPATIENT)
Facility: OTHER | Age: 33
LOS: 3 days | Discharge: HOME | End: 2023-01-06
Attending: OBSTETRICS & GYNECOLOGY | Admitting: OBSTETRICS & GYNECOLOGY
Payer: COMMERCIAL

## 2023-01-03 ENCOUNTER — ANESTHESIA (OUTPATIENT)
Dept: OBSTETRICS AND GYNECOLOGY | Facility: OTHER | Age: 33
End: 2023-01-03
Payer: COMMERCIAL

## 2023-01-03 DIAGNOSIS — Z98.891 STATUS POST PRIMARY LOW TRANSVERSE CESAREAN SECTION: Primary | ICD-10-CM

## 2023-01-03 DIAGNOSIS — Z98.891 STATUS POST PRIMARY LOW TRANSVERSE CESAREAN SECTION: ICD-10-CM

## 2023-01-03 DIAGNOSIS — Z3A.40 40 WEEKS GESTATION OF PREGNANCY: ICD-10-CM

## 2023-01-03 DIAGNOSIS — O42.92 FULL-TERM PREMATURE RUPTURE OF MEMBRANES, UNSPECIFIED DURATION TO ONSET OF LABOR: ICD-10-CM

## 2023-01-03 DIAGNOSIS — O42.92 FULL-TERM PREMATURE RUPTURE OF MEMBRANES: ICD-10-CM

## 2023-01-03 PROBLEM — O98.419 HEP B COMPLICATING PREGNANCY: Status: RESOLVED | Noted: 2022-09-22 | Resolved: 2023-01-03

## 2023-01-03 PROBLEM — B19.10 HEP B COMPLICATING PREGNANCY: Status: RESOLVED | Noted: 2022-09-22 | Resolved: 2023-01-03

## 2023-01-03 PROBLEM — O40.9XX0 POLYHYDRAMNIOS, ANTEPARTUM: Status: RESOLVED | Noted: 2022-09-28 | Resolved: 2023-01-03

## 2023-01-03 PROBLEM — D64.9 ANEMIA: Status: RESOLVED | Noted: 2022-10-18 | Resolved: 2023-01-03

## 2023-01-03 PROBLEM — Z34.90 PREGNANT: Status: RESOLVED | Noted: 2022-09-22 | Resolved: 2023-01-03

## 2023-01-03 LAB
ABO + RH BLD: NORMAL
ALLENS TEST: ABNORMAL
ALLENS TEST: ABNORMAL
BASOPHILS # BLD AUTO: 0.03 K/UL (ref 0–0.2)
BASOPHILS NFR BLD: 0.4 % (ref 0–1.9)
BLD GP AB SCN CELLS X3 SERPL QL: NORMAL
DIFFERENTIAL METHOD: ABNORMAL
EOSINOPHIL # BLD AUTO: 0.1 K/UL (ref 0–0.5)
EOSINOPHIL NFR BLD: 1 % (ref 0–8)
ERYTHROCYTE [DISTWIDTH] IN BLOOD BY AUTOMATED COUNT: 15.5 % (ref 11.5–14.5)
HCO3 UR-SCNC: 22.6 MMOL/L (ref 24–28)
HCO3 UR-SCNC: 27.4 MMOL/L (ref 24–28)
HCT VFR BLD AUTO: 36.5 % (ref 37–48.5)
HGB BLD-MCNC: 13 G/DL (ref 12–16)
IMM GRANULOCYTES # BLD AUTO: 0.09 K/UL (ref 0–0.04)
IMM GRANULOCYTES NFR BLD AUTO: 1.2 % (ref 0–0.5)
LYMPHOCYTES # BLD AUTO: 1.7 K/UL (ref 1–4.8)
LYMPHOCYTES NFR BLD: 21.1 % (ref 18–48)
MCH RBC QN AUTO: 36.3 PG (ref 27–31)
MCHC RBC AUTO-ENTMCNC: 35.6 G/DL (ref 32–36)
MCV RBC AUTO: 102 FL (ref 82–98)
MONOCYTES # BLD AUTO: 0.8 K/UL (ref 0.3–1)
MONOCYTES NFR BLD: 10.6 % (ref 4–15)
NEUTROPHILS # BLD AUTO: 5.1 K/UL (ref 1.8–7.7)
NEUTROPHILS NFR BLD: 65.7 % (ref 38–73)
NRBC BLD-RTO: 0 /100 WBC
PCO2 BLDA: 39.9 MMHG (ref 35–45)
PCO2 BLDA: 65.8 MMHG (ref 35–45)
PH SMN: 7.23 [PH] (ref 7.35–7.45)
PH SMN: 7.36 [PH] (ref 7.35–7.45)
PLATELET # BLD AUTO: 149 K/UL (ref 150–450)
PMV BLD AUTO: 10.1 FL (ref 9.2–12.9)
PO2 BLDA: 10 MMHG (ref 80–100)
PO2 BLDA: 35 MMHG (ref 80–100)
POC BE: -3 MMOL/L
POC BE: 0 MMOL/L
POC SATURATED O2: 64 % (ref 95–100)
POC SATURATED O2: 7 % (ref 95–100)
POC TCO2: 24 MMOL/L (ref 23–27)
POC TCO2: 29 MMOL/L (ref 23–27)
RBC # BLD AUTO: 3.58 M/UL (ref 4–5.4)
SAMPLE: ABNORMAL
SAMPLE: ABNORMAL
SITE: ABNORMAL
SITE: ABNORMAL
WBC # BLD AUTO: 7.82 K/UL (ref 3.9–12.7)

## 2023-01-03 PROCEDURE — 36004725 HC OB OR TIME LEV III - EA ADD 15 MIN: Performed by: STUDENT IN AN ORGANIZED HEALTH CARE EDUCATION/TRAINING PROGRAM

## 2023-01-03 PROCEDURE — 59514 PRA REAN DELIVERY ONLY: ICD-10-PCS | Mod: AA,,, | Performed by: ANESTHESIOLOGY

## 2023-01-03 PROCEDURE — 59510 CESAREAN DELIVERY: CPT | Mod: ,,, | Performed by: STUDENT IN AN ORGANIZED HEALTH CARE EDUCATION/TRAINING PROGRAM

## 2023-01-03 PROCEDURE — 25000003 PHARM REV CODE 250: Performed by: ANESTHESIOLOGY

## 2023-01-03 PROCEDURE — 11000001 HC ACUTE MED/SURG PRIVATE ROOM

## 2023-01-03 PROCEDURE — 71000033 HC RECOVERY, INTIAL HOUR: Performed by: STUDENT IN AN ORGANIZED HEALTH CARE EDUCATION/TRAINING PROGRAM

## 2023-01-03 PROCEDURE — 99284 PR EMERGENCY DEPT VISIT,LEVEL IV: ICD-10-PCS | Mod: 25,,, | Performed by: OBSTETRICS & GYNECOLOGY

## 2023-01-03 PROCEDURE — 99284 EMERGENCY DEPT VISIT MOD MDM: CPT | Mod: 25,,, | Performed by: OBSTETRICS & GYNECOLOGY

## 2023-01-03 PROCEDURE — 37000008 HC ANESTHESIA 1ST 15 MINUTES: Performed by: STUDENT IN AN ORGANIZED HEALTH CARE EDUCATION/TRAINING PROGRAM

## 2023-01-03 PROCEDURE — 59025 PR FETAL 2N-STRESS TEST: ICD-10-PCS | Mod: 26,,, | Performed by: OBSTETRICS & GYNECOLOGY

## 2023-01-03 PROCEDURE — 71000039 HC RECOVERY, EACH ADD'L HOUR: Performed by: STUDENT IN AN ORGANIZED HEALTH CARE EDUCATION/TRAINING PROGRAM

## 2023-01-03 PROCEDURE — 63600175 PHARM REV CODE 636 W HCPCS: Performed by: STUDENT IN AN ORGANIZED HEALTH CARE EDUCATION/TRAINING PROGRAM

## 2023-01-03 PROCEDURE — 72100003 HC LABOR CARE, EA. ADDL. 8 HRS

## 2023-01-03 PROCEDURE — 82803 BLOOD GASES ANY COMBINATION: CPT

## 2023-01-03 PROCEDURE — 63600175 PHARM REV CODE 636 W HCPCS

## 2023-01-03 PROCEDURE — 37000009 HC ANESTHESIA EA ADD 15 MINS: Performed by: STUDENT IN AN ORGANIZED HEALTH CARE EDUCATION/TRAINING PROGRAM

## 2023-01-03 PROCEDURE — 86900 BLOOD TYPING SEROLOGIC ABO: CPT | Performed by: GENERAL PRACTICE

## 2023-01-03 PROCEDURE — 59510 PR FULL ROUT OBSTE CARE,CESAREAN DELIV: ICD-10-PCS | Mod: ,,, | Performed by: STUDENT IN AN ORGANIZED HEALTH CARE EDUCATION/TRAINING PROGRAM

## 2023-01-03 PROCEDURE — 51702 INSERT TEMP BLADDER CATH: CPT

## 2023-01-03 PROCEDURE — 99900035 HC TECH TIME PER 15 MIN (STAT)

## 2023-01-03 PROCEDURE — 59025 FETAL NON-STRESS TEST: CPT

## 2023-01-03 PROCEDURE — 72100002 HC LABOR CARE, 1ST 8 HOURS

## 2023-01-03 PROCEDURE — 99285 EMERGENCY DEPT VISIT HI MDM: CPT | Mod: 25

## 2023-01-03 PROCEDURE — 63600175 PHARM REV CODE 636 W HCPCS: Performed by: GENERAL PRACTICE

## 2023-01-03 PROCEDURE — 85025 COMPLETE CBC W/AUTO DIFF WBC: CPT | Performed by: GENERAL PRACTICE

## 2023-01-03 PROCEDURE — 36004724 HC OB OR TIME LEV III - 1ST 15 MIN: Performed by: STUDENT IN AN ORGANIZED HEALTH CARE EDUCATION/TRAINING PROGRAM

## 2023-01-03 PROCEDURE — 59025 FETAL NON-STRESS TEST: CPT | Mod: 26,,, | Performed by: OBSTETRICS & GYNECOLOGY

## 2023-01-03 PROCEDURE — 59514 CESAREAN DELIVERY ONLY: CPT | Mod: AA,,, | Performed by: ANESTHESIOLOGY

## 2023-01-03 PROCEDURE — 63600175 PHARM REV CODE 636 W HCPCS: Performed by: ANESTHESIOLOGY

## 2023-01-03 RX ORDER — TERBUTALINE SULFATE 1 MG/ML
INJECTION SUBCUTANEOUS
Status: DISPENSED
Start: 2023-01-03 | End: 2023-01-04

## 2023-01-03 RX ORDER — LIDOCAINE HYDROCHLORIDE 10 MG/ML
10 INJECTION INFILTRATION; PERINEURAL ONCE AS NEEDED
Status: DISCONTINUED | OUTPATIENT
Start: 2023-01-03 | End: 2023-01-04

## 2023-01-03 RX ORDER — PHENYLEPHRINE HCL IN 0.9% NACL 1 MG/10 ML
SYRINGE (ML) INTRAVENOUS
Status: DISCONTINUED | OUTPATIENT
Start: 2023-01-03 | End: 2023-01-03

## 2023-01-03 RX ORDER — KETOROLAC TROMETHAMINE 30 MG/ML
30 INJECTION, SOLUTION INTRAMUSCULAR; INTRAVENOUS EVERY 6 HOURS
Status: DISCONTINUED | OUTPATIENT
Start: 2023-01-03 | End: 2023-01-04

## 2023-01-03 RX ORDER — ACETAMINOPHEN 325 MG/1
650 TABLET ORAL EVERY 6 HOURS PRN
Status: DISCONTINUED | OUTPATIENT
Start: 2023-01-03 | End: 2023-01-04

## 2023-01-03 RX ORDER — FENTANYL CITRATE 50 UG/ML
INJECTION, SOLUTION INTRAMUSCULAR; INTRAVENOUS
Status: DISCONTINUED | OUTPATIENT
Start: 2023-01-03 | End: 2023-01-03

## 2023-01-03 RX ORDER — KETOROLAC TROMETHAMINE 30 MG/ML
INJECTION, SOLUTION INTRAMUSCULAR; INTRAVENOUS
Status: COMPLETED
Start: 2023-01-03 | End: 2023-01-03

## 2023-01-03 RX ORDER — BUPIVACAINE HYDROCHLORIDE 7.5 MG/ML
INJECTION, SOLUTION INTRASPINAL
Status: DISCONTINUED | OUTPATIENT
Start: 2023-01-03 | End: 2023-01-03

## 2023-01-03 RX ORDER — EPHEDRINE SULFATE 50 MG/ML
INJECTION, SOLUTION INTRAVENOUS
Status: DISCONTINUED | OUTPATIENT
Start: 2023-01-03 | End: 2023-01-03

## 2023-01-03 RX ORDER — TRANEXAMIC ACID 100 MG/ML
1000 INJECTION, SOLUTION INTRAVENOUS ONCE AS NEEDED
Status: DISCONTINUED | OUTPATIENT
Start: 2023-01-03 | End: 2023-01-04

## 2023-01-03 RX ORDER — OXYTOCIN/RINGER'S LACTATE 30/500 ML
95 PLASTIC BAG, INJECTION (ML) INTRAVENOUS ONCE
Status: DISCONTINUED | OUTPATIENT
Start: 2023-01-03 | End: 2023-01-04

## 2023-01-03 RX ORDER — IBUPROFEN 800 MG/1
800 TABLET ORAL EVERY 8 HOURS
Qty: 30 TABLET | Refills: 1 | Status: SHIPPED | OUTPATIENT
Start: 2023-01-05 | End: 2023-03-30

## 2023-01-03 RX ORDER — ONDANSETRON HYDROCHLORIDE 2 MG/ML
INJECTION, SOLUTION INTRAMUSCULAR; INTRAVENOUS
Status: DISCONTINUED | OUTPATIENT
Start: 2023-01-03 | End: 2023-01-03

## 2023-01-03 RX ORDER — SIMETHICONE 80 MG
1 TABLET,CHEWABLE ORAL 4 TIMES DAILY PRN
Status: DISCONTINUED | OUTPATIENT
Start: 2023-01-03 | End: 2023-01-04

## 2023-01-03 RX ORDER — KETOROLAC TROMETHAMINE 30 MG/ML
30 INJECTION, SOLUTION INTRAMUSCULAR; INTRAVENOUS EVERY 6 HOURS
Status: DISCONTINUED | OUTPATIENT
Start: 2023-01-04 | End: 2023-01-03

## 2023-01-03 RX ORDER — PROMETHAZINE HYDROCHLORIDE 25 MG/ML
INJECTION, SOLUTION INTRAMUSCULAR; INTRAVENOUS
Status: DISCONTINUED | OUTPATIENT
Start: 2023-01-03 | End: 2023-01-03

## 2023-01-03 RX ORDER — CHLORHEXIDINE GLUCONATE ORAL RINSE 1.2 MG/ML
10 SOLUTION DENTAL 2 TIMES DAILY
Status: DISCONTINUED | OUTPATIENT
Start: 2023-01-03 | End: 2023-01-06 | Stop reason: HOSPADM

## 2023-01-03 RX ORDER — DEXAMETHASONE SODIUM PHOSPHATE 4 MG/ML
INJECTION, SOLUTION INTRA-ARTICULAR; INTRALESIONAL; INTRAMUSCULAR; INTRAVENOUS; SOFT TISSUE
Status: DISCONTINUED | OUTPATIENT
Start: 2023-01-03 | End: 2023-01-03

## 2023-01-03 RX ORDER — PROCHLORPERAZINE EDISYLATE 5 MG/ML
5 INJECTION INTRAMUSCULAR; INTRAVENOUS EVERY 6 HOURS PRN
Status: DISCONTINUED | OUTPATIENT
Start: 2023-01-03 | End: 2023-01-04

## 2023-01-03 RX ORDER — MORPHINE SULFATE 0.5 MG/ML
INJECTION, SOLUTION EPIDURAL; INTRATHECAL; INTRAVENOUS
Status: DISCONTINUED | OUTPATIENT
Start: 2023-01-03 | End: 2023-01-03

## 2023-01-03 RX ORDER — OXYCODONE AND ACETAMINOPHEN 5; 325 MG/1; MG/1
1 TABLET ORAL EVERY 6 HOURS PRN
Qty: 16 EACH | Refills: 0 | Status: SHIPPED | OUTPATIENT
Start: 2023-01-03

## 2023-01-03 RX ORDER — IBUPROFEN 400 MG/1
800 TABLET ORAL EVERY 8 HOURS
Status: DISCONTINUED | OUTPATIENT
Start: 2023-01-05 | End: 2023-01-06 | Stop reason: HOSPADM

## 2023-01-03 RX ORDER — KETOROLAC TROMETHAMINE 30 MG/ML
30 INJECTION, SOLUTION INTRAMUSCULAR; INTRAVENOUS EVERY 6 HOURS
Status: COMPLETED | OUTPATIENT
Start: 2023-01-04 | End: 2023-01-04

## 2023-01-03 RX ORDER — CALCIUM CARBONATE 200(500)MG
500 TABLET,CHEWABLE ORAL 3 TIMES DAILY PRN
Status: DISCONTINUED | OUTPATIENT
Start: 2023-01-03 | End: 2023-01-04

## 2023-01-03 RX ORDER — ONDANSETRON 8 MG/1
8 TABLET, ORALLY DISINTEGRATING ORAL EVERY 8 HOURS PRN
Status: DISCONTINUED | OUTPATIENT
Start: 2023-01-03 | End: 2023-01-04

## 2023-01-03 RX ORDER — OXYTOCIN 10 [USP'U]/ML
INJECTION, SOLUTION INTRAMUSCULAR; INTRAVENOUS
Status: DISCONTINUED | OUTPATIENT
Start: 2023-01-03 | End: 2023-01-03

## 2023-01-03 RX ORDER — PHENYLEPHRINE HYDROCHLORIDE 10 MG/ML
INJECTION INTRAVENOUS CONTINUOUS PRN
Status: DISCONTINUED | OUTPATIENT
Start: 2023-01-03 | End: 2023-01-03

## 2023-01-03 RX ORDER — CEFAZOLIN SODIUM 2 G/50ML
2 SOLUTION INTRAVENOUS ONCE AS NEEDED
Status: DISCONTINUED | OUTPATIENT
Start: 2023-01-03 | End: 2023-01-04

## 2023-01-03 RX ORDER — OXYTOCIN/RINGER'S LACTATE 30/500 ML
334 PLASTIC BAG, INJECTION (ML) INTRAVENOUS ONCE
Status: COMPLETED | OUTPATIENT
Start: 2023-01-03 | End: 2023-01-03

## 2023-01-03 RX ORDER — OXYTOCIN/RINGER'S LACTATE 30/500 ML
0-32 PLASTIC BAG, INJECTION (ML) INTRAVENOUS CONTINUOUS
Status: DISCONTINUED | OUTPATIENT
Start: 2023-01-03 | End: 2023-01-04

## 2023-01-03 RX ORDER — DOCUSATE SODIUM 100 MG/1
100 CAPSULE, LIQUID FILLED ORAL 2 TIMES DAILY
Qty: 60 CAPSULE | Refills: 0 | Status: SHIPPED | OUTPATIENT
Start: 2023-01-03 | End: 2023-03-30

## 2023-01-03 RX ORDER — SODIUM CHLORIDE 9 MG/ML
INJECTION, SOLUTION INTRAVENOUS
Status: DISCONTINUED | OUTPATIENT
Start: 2023-01-03 | End: 2023-01-04

## 2023-01-03 RX ORDER — SODIUM CHLORIDE, SODIUM LACTATE, POTASSIUM CHLORIDE, CALCIUM CHLORIDE 600; 310; 30; 20 MG/100ML; MG/100ML; MG/100ML; MG/100ML
INJECTION, SOLUTION INTRAVENOUS CONTINUOUS
Status: DISCONTINUED | OUTPATIENT
Start: 2023-01-03 | End: 2023-01-04

## 2023-01-03 RX ADMIN — Medication 300 MCG: at 07:01

## 2023-01-03 RX ADMIN — MORPHINE SULFATE 0.1 MG: 0.5 INJECTION, SOLUTION EPIDURAL; INTRATHECAL; INTRAVENOUS at 07:01

## 2023-01-03 RX ADMIN — DEXAMETHASONE SODIUM PHOSPHATE 4 MG: 4 INJECTION INTRA-ARTICULAR; INTRALESIONAL; INTRAMUSCULAR; INTRAVENOUS; SOFT TISSUE at 07:01

## 2023-01-03 RX ADMIN — KETOROLAC TROMETHAMINE 30 MG: 30 INJECTION, SOLUTION INTRAMUSCULAR; INTRAVENOUS at 08:01

## 2023-01-03 RX ADMIN — Medication 334 MILLI-UNITS/MIN: at 08:01

## 2023-01-03 RX ADMIN — PHENYLEPHRINE HYDROCHLORIDE 0.5 MCG/KG/MIN: 10 INJECTION INTRAVENOUS at 07:01

## 2023-01-03 RX ADMIN — OXYTOCIN 12 UNITS: 10 INJECTION, SOLUTION INTRAMUSCULAR; INTRAVENOUS at 07:01

## 2023-01-03 RX ADMIN — SODIUM CHLORIDE, SODIUM LACTATE, POTASSIUM CHLORIDE, AND CALCIUM CHLORIDE 1000 ML: .6; .31; .03; .02 INJECTION, SOLUTION INTRAVENOUS at 08:01

## 2023-01-03 RX ADMIN — BUPIVACAINE HYDROCHLORIDE 1.6 ML: 7.5 INJECTION, SOLUTION INTRASPINAL at 07:01

## 2023-01-03 RX ADMIN — EPHEDRINE SULFATE 50 MG: 50 INJECTION INTRAVENOUS at 07:01

## 2023-01-03 RX ADMIN — FENTANYL CITRATE 10 MCG: 50 INJECTION INTRAMUSCULAR; INTRAVENOUS at 07:01

## 2023-01-03 RX ADMIN — PROMETHAZINE HYDROCHLORIDE 12.5 MG: 25 INJECTION INTRAMUSCULAR; INTRAVENOUS at 07:01

## 2023-01-03 RX ADMIN — ONDANSETRON 4 MG: 2 INJECTION INTRAMUSCULAR; INTRAVENOUS at 07:01

## 2023-01-03 RX ADMIN — Medication 4 MILLI-UNITS/MIN: at 04:01

## 2023-01-03 RX ADMIN — SODIUM CHLORIDE, SODIUM LACTATE, POTASSIUM CHLORIDE, AND CALCIUM CHLORIDE: .6; .31; .03; .02 INJECTION, SOLUTION INTRAVENOUS at 10:01

## 2023-01-03 NOTE — PLAN OF CARE
Problem: Adult Inpatient Plan of Care  Goal: Plan of Care Review  Outcome: Ongoing, Progressing  Goal: Absence of Hospital-Acquired Illness or Injury  Outcome: Ongoing, Progressing  Goal: Optimal Comfort and Wellbeing  Outcome: Ongoing, Progressing  Goal: Readiness for Transition of Care  Outcome: Ongoing, Progressing     Problem: Infection  Goal: Absence of Infection Signs and Symptoms  Outcome: Ongoing, Progressing     Problem:  Fall Injury Risk  Goal: Absence of Fall, Infant Drop and Related Injury  Outcome: Ongoing, Progressing     Problem: Bleeding (Labor)  Goal: Hemostasis  Outcome: Ongoing, Progressing     Problem: Change in Fetal Wellbeing (Labor)  Goal: Stable Fetal Wellbeing  Outcome: Ongoing, Progressing     Problem: Delayed Labor Progression (Labor)  Goal: Effective Progression to Delivery  Outcome: Ongoing, Progressing     Problem: Infection (Labor)  Goal: Absence of Infection Signs and Symptoms  Outcome: Ongoing, Progressing     Problem: Labor Pain (Labor)  Goal: Acceptable Pain Control  Outcome: Ongoing, Progressing     Problem: Uterine Tachysystole (Labor)  Goal: Normal Uterine Contraction Pattern  Outcome: Ongoing, Progressing

## 2023-01-03 NOTE — ED PROVIDER NOTES
Encounter Date: 1/3/2023       History     Chief Complaint   Patient presents with    leaking fluid     HPI    Michelle Hernandez is a 32 y.o. [id number] at 40w6d presents complaining of leakage of fluid.   This IUP is complicated by anemia, mild polyhydramnios, HSV , and hepatitis B. Patient reports contractions, denies vaginal bleeding, reports LOF.   Fetal Movement: normal.    Review of patient's allergies indicates:   Allergen Reactions    Shellfish derived Anaphylaxis     Shrimp, lobster, itchy, throat swelling     Past Medical History:   Diagnosis Date    Abnormal Pap smear of cervix     Herpes simplex virus (HSV) infection      No past surgical history on file.  Family History   Problem Relation Age of Onset    Colon cancer Maternal Grandmother     Breast cancer Neg Hx     Ovarian cancer Neg Hx      Social History     Tobacco Use    Smoking status: Never    Smokeless tobacco: Never   Substance Use Topics    Alcohol use: Not Currently    Drug use: Never     Review of Systems   Constitutional:  Negative for chills and fever.   HENT:  Negative for congestion and sore throat.    Eyes:  Negative for visual disturbance.   Respiratory:  Negative for shortness of breath.    Cardiovascular:  Negative for chest pain.   Gastrointestinal:  Positive for abdominal pain (contractions). Negative for constipation, diarrhea and nausea.   Genitourinary:  Positive for vaginal discharge (leakage of fluid). Negative for dysuria, frequency and vaginal bleeding.   Musculoskeletal:  Negative for back pain.   Skin:  Negative for rash.   Neurological:  Negative for weakness, light-headedness and headaches.   Hematological:  Does not bruise/bleed easily.   Psychiatric/Behavioral:  The patient is not nervous/anxious.      Physical Exam     Initial Vitals   BP Pulse Resp Temp SpO2   23 0939 23 0908 23 1100 23 0843 23 0915   98/64 109 18 98 °F (36.7 °C) 96 %      MAP       --                Physical Exam    Vitals  reviewed.  Constitutional: She appears well-developed and well-nourished. She is not diaphoretic. No distress.   HENT:   Head: Normocephalic and atraumatic.   Eyes: EOM are normal.   Neck: Neck supple.   Normal range of motion.  Cardiovascular:  Normal rate.           Pulmonary/Chest: No respiratory distress.   Abdominal: There is no abdominal tenderness. There is no guarding.   Musculoskeletal:      Cervical back: Normal range of motion and neck supple.     Neurological: She is alert and oriented to person, place, and time.   Skin: Skin is warm and dry.   Psychiatric: She has a normal mood and affect. Her behavior is normal. Thought content normal.     OB LABOR EXAM:   Pre-Term Labor: No.   Membranes ruptured: Yes.   Method: Sterile vaginal exam per MD and Sterile speculum exam per MD.       Dilatation: 2.   Station: -3.   Effacement: 70%.   Amniotic Fluid Color: meconium.   Amniotic Fluid Amount: moderate.       ED Course   Obtain Fetal nonstress test (NST)    Date/Time: 1/3/2023 11:13 AM  Performed by: Pete Hyatt MD  Authorized by: Pete Hyatt MD     Nonstress Test:     Variability:  6-25 BPM    Decelerations:  None    Accelerations:  15 bpm    Contractions:  Regular    Contraction Frequency:  Q 4-6 minutes  Biophysical Profile:     Nonstress Test Interpretation: reactive      Overall Impression:  Reassuring  Labs Reviewed   CBC W/ AUTO DIFFERENTIAL - Abnormal; Notable for the following components:       Result Value    RBC 3.58 (*)     Hematocrit 36.5 (*)      (*)     MCH 36.3 (*)     RDW 15.5 (*)     Platelets 149 (*)     Immature Granulocytes 1.2 (*)     Immature Grans (Abs) 0.09 (*)     All other components within normal limits   TYPE & SCREEN          Imaging Results    None          Medications   lactated ringers bolus 1,000 mL (has no administration in time range)   lactated ringers bolus 500 mL (has no administration in time range)   lactated ringers infusion ( Intravenous New Bag 1/3/23  1047)   0.9%  NaCl infusion (has no administration in time range)   oxytocin 30 units in 500 mL lactated ringers infusion (non-titrating) (has no administration in time range)   oxytocin 30 units in 500 mL lactated ringers infusion (non-titrating) (has no administration in time range)   tranexamic acid (CYKLOKAPRON) 1,000 mg in sodium chloride 0.9 % 100 mL (ready to mix system) (has no administration in time range)   cefazolin (ANCEF) 2 gram in dextrose 5% 50 mL IVPB (premix) (has no administration in time range)   azithromycin 500 mg in dextrose 5 % 250 mL IVPB (ready to mix system) (has no administration in time range)   ondansetron disintegrating tablet 8 mg (has no administration in time range)   prochlorperazine injection Soln 5 mg (has no administration in time range)   acetaminophen tablet 650 mg (has no administration in time range)   calcium carbonate 200 mg calcium (500 mg) chewable tablet 500 mg (has no administration in time range)   simethicone chewable tablet 80 mg (has no administration in time range)   LIDOcaine HCL 10 mg/ml (1%) injection 10 mL (has no administration in time range)   oxytocin 30 units in 500 mL lactated ringers infusion (titrating) (has no administration in time range)     Medical Decision Making:   ED Management:  Patient afebrile, VSS. NST reactive and reassuring.    Rule-out rupture:  - SSE: positive pooling of meconium fluid; positive valsalva and nitrazine  - Exam reassuring for rupture of membranes   - SVE: /-3  - Admit to Labor and Delivery unit  - Consents for delivery including vaginal or  section and blood transfusion signed and to chart  - Risks, benefits, alternatives and possible complications have been discussed in detail with the patient.   - Epidural per Anesthesia  - Draw CBC, T&S  - Notify Staff  - Recheck in 4 hrs or PRN                Attending Attestation:   Physician Attestation Statement for Resident:  As the supervising MD   Physician Attestation  Statement: I have personally seen and examined this patient.   I agree with the above history.  -:   As the supervising MD I agree with the above PE.     As the supervising MD I agree with the above treatment, course, plan, and disposition.   -: NST reactive with early decels, toco with contractions every 4 minutes.  Exam c/w SROM.  Will admit to L&D.  Anticipate .  I was personally present during the critical portions of the procedure(s) performed by the resident and was immediately available in the ED to provide services and assistance as needed during the entire procedure.   I have reviewed the following: old records at this facility.                           Clinical Impression:   Final diagnoses:  [O42.92] Full-term premature rupture of membranes, unspecified duration to onset of labor (Primary)  [O42.92] Full-term premature rupture of membranes  [Z3A.40] 40 weeks gestation of pregnancy        ED Disposition Condition    Send to L&D                 Anna Maria MD  [phone]

## 2023-01-03 NOTE — PROGRESS NOTES
LABOR NOTE    Resident to bedside for routine cervical check.    S:  Complaints: No.  Epidural working:  not applicable      O: /67   Pulse 84   Temp 98 °F (36.7 °C) (Oral)   LMP 2022   SpO2 96%       FHT: Cat 2 (reassuring), baseline 145, mod elena, + accels, - decels  CTX: q 5-6 minutes  SVE: 3/80/-2    TIMELINE:  0900: /-3  1100: 3/80/-2    A/P: 32 y.o.  at 40w6d, admitted for PROM    Labor management:  - Continue Close Maternal/Fetal Monitoring  - Recheck 4 hours or PRN      Pete Hyatt MD  PGY-1 OBGYN

## 2023-01-03 NOTE — ANESTHESIA PREPROCEDURE EVALUATION
[facility]  Anesthesia Pre-Operative Evaluation         Patient Name: Michelle Hernandez  YOB: 1990  MRN: [id number]    2023      Michelle Hernandez is a 32 y.o. female  @ 40w6d who presents for PROM.   Patient denies cardiopulmonary disease, bleeding disorders, or spine pathology. History notable for anemia and HepB.     OB History    Para Term  AB Living   2       1     SAB IAB Ectopic Multiple Live Births                  # Outcome Date GA Lbr Hang/2nd Weight Sex Delivery Anes PTL Lv   2 Current            1 AB 2006               Review of patient's allergies indicates:   Allergen Reactions    Shellfish derived Anaphylaxis     Shrimp, lobster, itchy, throat swelling       Wt Readings from Last 1 Encounters:   22 1034 68.1 kg (150 lb 2.1 oz)       BP Readings from Last 3 Encounters:   23 102/67   22 (!) 98/56   22 112/69       Patient Active Problem List   Diagnosis    Pregnant    Genital HSV    Fetal pyelectasis    Hep B complicating pregnancy    Polyhydramnios, antepartum    Anemia -- HCT 28    Full-term premature rupture of membranes       No past surgical history on file.    Social History     Socioeconomic History    Marital status:    Tobacco Use    Smoking status: Never    Smokeless tobacco: Never   Substance and Sexual Activity    Alcohol use: Not Currently    Drug use: Never    Sexual activity: Not Currently     Partners: Male         Chemistry        Component Value Date/Time     2022 1316    K 3.8 2022 1316     2022 1316    CO2 24 2022 1316    BUN 5 (L) 2022 1316    CREATININE 0.6 2022 1316    GLU 69 (L) 2022 1316        Component Value Date/Time    CALCIUM 8.6 (L) 2022 1316    ALKPHOS 103 2022 1316    AST 21 2022 1316    ALT 15 2022 1316    BILITOT 0.3 2022 1316            Lab Results   Component Value Date    WBC 7.82  01/03/2023    HGB 13.0 01/03/2023    HCT 36.5 (L) 01/03/2023     (H) 01/03/2023     (L) 01/03/2023       No results for input(s): PT, INR, PROTIME, APTT in the last 72 hours.        Pre-op Assessment    I have reviewed the Patient Summary Reports.     I have reviewed the Nursing Notes. I have reviewed the NPO Status.   I have reviewed the Medications.     Review of Systems  Anesthesia Hx:  No previous Anesthesia Denies Hx of Anesthetic complications  Neg history of prior surgery. Denies Family Hx of Anesthesia complications.   Denies Personal Hx of Anesthesia complications.   Social:  Non-Smoker, No Alcohol Use    Hematology/Oncology:         -- Anemia: Denies Current/Recent Cancer   Cardiovascular:   Denies Hypertension.  Denies CAD.    Denies Dysrhythmias.   Denies CHF. no hyperlipidemia    Pulmonary:   Denies COPD.  Denies Asthma.  Denies Sleep Apnea.    Renal/:   Denies Chronic Renal Disease.     Hepatic/GI:   Denies GERD. Denies Liver Disease. Hepatitis, B    Musculoskeletal:   Denies Arthritis.     Neurological:   Denies CVA. Denies Neuromuscular Disease.  Denies Seizures.   Denies Chronic Pain Syndrome   Endocrine:   Denies Diabetes. Denies Hyperthyroidism.  Denies Obesity / BMI > 30  Psych:   denies anxiety denies depression          Physical Exam  General: Well nourished, Cooperative, Alert and Oriented    Airway:  Mallampati: III   Mouth Opening: Normal  TM Distance: Normal  Tongue: Normal  Neck ROM: Normal ROM    Dental:  Intact        Anesthesia Plan  Type of Anesthesia, risks & benefits discussed:    Anesthesia Type: Epidural, Spinal, CSE  Intra-op Monitoring Plan: Standard ASA Monitors  Post Op Pain Control Plan: multimodal analgesia, epidural analgesia, intrathecal opioid and IV/PO Opioids PRN  Induction:  IV and rapid sequence  Airway Plan: Video, Post-Induction  Informed Consent: Informed consent signed with the Patient and all parties understand the risks and agree with anesthesia  plan.  All questions answered.   ASA Score: 2  Day of Surgery Review of History & Physical: H&P Update referred to the surgeon/provider.    Ready For Surgery From Anesthesia Perspective.     .

## 2023-01-03 NOTE — PROGRESS NOTES
"LABOR NOTE    Resident to bedside for fetal deceleration    S:  Complaints: No.  Epidural working:  yes      O: /63   Pulse 84   Temp 98.6 °F (37 °C) (Oral)   Resp 18   Ht 5' 1" (1.549 m)   Wt 68.1 kg (150 lb 2.1 oz)   LMP 2022   SpO2 98%   Breastfeeding No   BMI 28.37 kg/m²       FHT: Cat 2, baseline 145 bpm, mod elena, deceleration to 60s with return to baseline after repositioning  CTX: q 5-6 minutes  SVE: 80/-2    TIMELINE:  0900: /-3  1100: 3/80/-2  1315: 80/-2  1700: 80/-2    A/P: 32 y.o.  at 40w6d, admitted for PROM    Labor management:  - Pitocin paused  - FHT now reassuring  - Continue Close Maternal/Fetal Monitoring  - Recheck 2-4 hours or PRN      Lanny Benz MD/MPH  OB/GYN PGY3    "

## 2023-01-03 NOTE — PROGRESS NOTES
"LABOR NOTE    Resident to bedside for routine cervical check.    S:  Complaints: No.  Epidural working:  not applicable      O: BP 97/63   Pulse 91   Temp 98.3 °F (36.8 °C) (Oral)   Resp 18   Ht 5' 1" (1.549 m)   Wt 68.1 kg (150 lb 2.1 oz)   LMP 2022   SpO2 97%   Breastfeeding No   BMI 28.37 kg/m²       FHT: Cat 2 (reassuring), baseline 145, mod elena, + accels, - decels  CTX: q 5-6 minutes  SVE: 3/80/-2    TIMELINE:  0900: /-3  1100: 3/80/-2  1315: /-2    A/P: 32 y.o.  at 40w6d, admitted for PROM    Labor management:  - Continue Close Maternal/Fetal Monitoring  - Recheck 2-4 hours or PRN      Pete Hyatt MD  PGY-1 OBGYN   "

## 2023-01-03 NOTE — H&P
HISTORY AND PHYSICAL                                                OBSTETRICS          Subjective:       Michelle Hernandez is a 32 y.o.  female with IUP at 40w6d weeks gestation who presents with PROM.    Patient reports contractions, denies vaginal bleeding, reports LOF.   Fetal Movement: normal.    This IUP is complicated by anemia, mild polyhydramnios, HSV (neg spec), and hepatitis B.    Review of Systems   Constitutional:  Negative for chills and fever.   HENT:  Negative for nasal congestion.    Eyes:  Negative for visual disturbance.   Respiratory:  Negative for cough and shortness of breath.    Cardiovascular:  Negative for chest pain.   Gastrointestinal:  Negative for abdominal pain, constipation and diarrhea.   Genitourinary:  Positive for vaginal discharge (meconium fluid). Negative for dysuria, frequency, urgency and vaginal bleeding.   Musculoskeletal:  Negative for myalgias.   Integumentary:  Negative for rash.   Neurological:  Negative for headaches.   Hematological:  Does not bruise/bleed easily.   Psychiatric/Behavioral:  The patient is not nervous/anxious.      PMHx:   Past Medical History:   Diagnosis Date    Abnormal Pap smear of cervix     Herpes simplex virus (HSV) infection        PSHx: No past surgical history on file.    All:   Review of patient's allergies indicates:   Allergen Reactions    Shellfish derived Anaphylaxis     Shrimp, lobster, itchy, throat swelling       Meds: (Not in a hospital admission)      SH:   Social History     Socioeconomic History    Marital status:    Tobacco Use    Smoking status: Never    Smokeless tobacco: Never   Substance and Sexual Activity    Alcohol use: Not Currently    Drug use: Never    Sexual activity: Not Currently     Partners: Male       FH:   Family History   Problem Relation Age of Onset    Colon cancer Maternal Grandmother     Breast cancer Neg Hx     Ovarian cancer Neg Hx        OBHx:   OB History    Para Term  AB  Living   2 0 0 0 0 0   SAB IAB Ectopic Multiple Live Births   0 0 0 0 0      # Outcome Date GA Lbr Hang/2nd Weight Sex Delivery Anes PTL Lv   2 Current            1                 Objective:       Temp 98 °F (36.7 °C) (Oral)   LMP 2022     Vitals:    23 0843   Temp: 98 °F (36.7 °C)   TempSrc: Oral       General: NAD, alert, oriented, cooperative  HEENT: NCAT, EOM grossly intact  Lungs: Normal WOB  Heart: regular rate  Abdomen: gravid, soft, nondistended, nontender, no rebound or guarding  Extremities: trace edema    FHT: 150 bpm, moderate BTBV, +accels, -decels; Cat 1 (reassuring)  [place]: q 5-6mins  Presentation: cephalic by ultrasound    Cervix: /-3  SSE: Positive pooling, valsalva, nitrazine. Negative for lesions on external genitalia, vagina, or cervix.      EFW by Leopold's: 7lbs      Recent Growth Scan: AC = 83%tile, EFW = 3279g 63%tile @ 37w0d    Lab Review  Blood Type A POS  GBBS: negative  Rubella: Immune  RPR: Non reactive  HIV: negative  HepB: negative       Assessment:       Michelle Hernandez is a 32 y.o.  female with IUP at 40w6d weeks gestation who presents with PROM.    Active Hospital Problems    Diagnosis  POA    *Full-term premature rupture of membranes [O42.92]  Unknown    Anemia -- HCT 28 [D64.9]  Yes    Polyhydramnios, antepartum [O40.9XX0]  Yes    Pregnant [Z34.90]  Not Applicable    Genital HSV [A60.00]  Yes    Fetal pyelectasis [O35.EXX0]  Not Applicable     Identified on 20w anatomy US, R 6.3 mm and L 5.8 mm at [facility] ([facility])  27w US with R 7.4 mm and L 5.4 mm, consistent with UTD A2-3, without evidence of calyceal involvement  Normal chromosomal screening results through [place] [facility] per records from [facility](unclear which specific test)      Hep B complicating pregnancy [O98.419, B19.10]  Yes      Resolved Hospital Problems   No resolved problems to display.          Plan:     PROM   Risks, benefits, alternatives and possible  complications have been discussed in detail with the patient.   - Consents signed and to chart  - Admit to Labor and Delivery unit  - Epidural per Anesthesia  - Draw CBC, T&S  - Notify Staff  - Recheck in 4 hrs or PRN  - Post-Partum Hemorrhage risk - low  - Postpartum lovenox: not indicated    2. Anemia  - Asymptomatic  - iron/colace     3. Mild Polyhydramnios  - DANNIELLE 26.6    4. Hep B  - Log HBV IU/mL  - No antiretroviral required    5. HSV  - Negative speculum exam       Pete Hyatt MD  PGY-1 OBGYN

## 2023-01-04 PROCEDURE — 63600175 PHARM REV CODE 636 W HCPCS

## 2023-01-04 PROCEDURE — 59510 PR FULL ROUT OBSTE CARE,CESAREAN DELIV: ICD-10-PCS | Mod: ,,, | Performed by: STUDENT IN AN ORGANIZED HEALTH CARE EDUCATION/TRAINING PROGRAM

## 2023-01-04 PROCEDURE — 11000001 HC ACUTE MED/SURG PRIVATE ROOM

## 2023-01-04 PROCEDURE — 25000003 PHARM REV CODE 250

## 2023-01-04 PROCEDURE — 25000003 PHARM REV CODE 250: Performed by: STUDENT IN AN ORGANIZED HEALTH CARE EDUCATION/TRAINING PROGRAM

## 2023-01-04 PROCEDURE — 25000003 PHARM REV CODE 250: Performed by: GENERAL PRACTICE

## 2023-01-04 PROCEDURE — 59510 CESAREAN DELIVERY: CPT | Mod: ,,, | Performed by: STUDENT IN AN ORGANIZED HEALTH CARE EDUCATION/TRAINING PROGRAM

## 2023-01-04 RX ORDER — DOCUSATE SODIUM 100 MG/1
200 CAPSULE, LIQUID FILLED ORAL 2 TIMES DAILY
Status: DISCONTINUED | OUTPATIENT
Start: 2023-01-04 | End: 2023-01-06 | Stop reason: HOSPADM

## 2023-01-04 RX ORDER — AMOXICILLIN 250 MG
1 CAPSULE ORAL NIGHTLY PRN
Status: DISCONTINUED | OUTPATIENT
Start: 2023-01-04 | End: 2023-01-06 | Stop reason: HOSPADM

## 2023-01-04 RX ORDER — MISOPROSTOL 200 UG/1
800 TABLET ORAL
Status: DISCONTINUED | OUTPATIENT
Start: 2023-01-04 | End: 2023-01-06 | Stop reason: HOSPADM

## 2023-01-04 RX ORDER — METHYLERGONOVINE MALEATE 0.2 MG/ML
200 INJECTION INTRAVENOUS
Status: DISCONTINUED | OUTPATIENT
Start: 2023-01-04 | End: 2023-01-06 | Stop reason: HOSPADM

## 2023-01-04 RX ORDER — PROCHLORPERAZINE EDISYLATE 5 MG/ML
5 INJECTION INTRAMUSCULAR; INTRAVENOUS EVERY 6 HOURS PRN
Status: DISCONTINUED | OUTPATIENT
Start: 2023-01-04 | End: 2023-01-06 | Stop reason: HOSPADM

## 2023-01-04 RX ORDER — OXYCODONE AND ACETAMINOPHEN 10; 325 MG/1; MG/1
1 TABLET ORAL EVERY 4 HOURS PRN
Status: DISCONTINUED | OUTPATIENT
Start: 2023-01-04 | End: 2023-01-05

## 2023-01-04 RX ORDER — DIPHENHYDRAMINE HCL 25 MG
25 CAPSULE ORAL EVERY 4 HOURS PRN
Status: DISCONTINUED | OUTPATIENT
Start: 2023-01-04 | End: 2023-01-06 | Stop reason: HOSPADM

## 2023-01-04 RX ORDER — OXYCODONE HYDROCHLORIDE 5 MG/1
10 TABLET ORAL EVERY 4 HOURS PRN
Status: DISCONTINUED | OUTPATIENT
Start: 2023-01-04 | End: 2023-01-04

## 2023-01-04 RX ORDER — ONDANSETRON 8 MG/1
8 TABLET, ORALLY DISINTEGRATING ORAL EVERY 8 HOURS PRN
Status: DISCONTINUED | OUTPATIENT
Start: 2023-01-04 | End: 2023-01-06 | Stop reason: HOSPADM

## 2023-01-04 RX ORDER — OXYCODONE AND ACETAMINOPHEN 5; 325 MG/1; MG/1
1 TABLET ORAL EVERY 4 HOURS PRN
Status: DISCONTINUED | OUTPATIENT
Start: 2023-01-04 | End: 2023-01-05

## 2023-01-04 RX ORDER — CARBOPROST TROMETHAMINE 250 UG/ML
250 INJECTION, SOLUTION INTRAMUSCULAR
Status: DISCONTINUED | OUTPATIENT
Start: 2023-01-04 | End: 2023-01-06 | Stop reason: HOSPADM

## 2023-01-04 RX ORDER — OXYCODONE HYDROCHLORIDE 5 MG/1
5 TABLET ORAL EVERY 4 HOURS PRN
Status: DISCONTINUED | OUTPATIENT
Start: 2023-01-04 | End: 2023-01-04

## 2023-01-04 RX ORDER — PRENATAL WITH FERROUS FUM AND FOLIC ACID 3080; 920; 120; 400; 22; 1.84; 3; 20; 10; 1; 12; 200; 27; 25; 2 [IU]/1; [IU]/1; MG/1; [IU]/1; MG/1; MG/1; MG/1; MG/1; MG/1; MG/1; UG/1; MG/1; MG/1; MG/1; MG/1
1 TABLET ORAL DAILY
Status: DISCONTINUED | OUTPATIENT
Start: 2023-01-04 | End: 2023-01-06 | Stop reason: HOSPADM

## 2023-01-04 RX ORDER — OXYCODONE AND ACETAMINOPHEN 5; 325 MG/1; MG/1
1 TABLET ORAL EVERY 4 HOURS PRN
Status: DISCONTINUED | OUTPATIENT
Start: 2023-01-04 | End: 2023-01-04

## 2023-01-04 RX ORDER — ADHESIVE BANDAGE
30 BANDAGE TOPICAL 2 TIMES DAILY PRN
Status: DISCONTINUED | OUTPATIENT
Start: 2023-01-04 | End: 2023-01-06 | Stop reason: HOSPADM

## 2023-01-04 RX ORDER — SODIUM CHLORIDE 0.9 % (FLUSH) 0.9 %
10 SYRINGE (ML) INJECTION
Status: DISCONTINUED | OUTPATIENT
Start: 2023-01-04 | End: 2023-01-06 | Stop reason: HOSPADM

## 2023-01-04 RX ORDER — SIMETHICONE 80 MG
1 TABLET,CHEWABLE ORAL EVERY 6 HOURS PRN
Status: DISCONTINUED | OUTPATIENT
Start: 2023-01-04 | End: 2023-01-06 | Stop reason: HOSPADM

## 2023-01-04 RX ORDER — ACETAMINOPHEN 325 MG/1
650 TABLET ORAL EVERY 6 HOURS
Status: DISCONTINUED | OUTPATIENT
Start: 2023-01-05 | End: 2023-01-04

## 2023-01-04 RX ORDER — ONDANSETRON 2 MG/ML
4 INJECTION INTRAMUSCULAR; INTRAVENOUS EVERY 6 HOURS PRN
Status: DISCONTINUED | OUTPATIENT
Start: 2023-01-04 | End: 2023-01-04

## 2023-01-04 RX ORDER — OXYTOCIN/RINGER'S LACTATE 30/500 ML
95 PLASTIC BAG, INJECTION (ML) INTRAVENOUS ONCE
Status: DISCONTINUED | OUTPATIENT
Start: 2023-01-04 | End: 2023-01-06 | Stop reason: HOSPADM

## 2023-01-04 RX ORDER — BISACODYL 10 MG
10 SUPPOSITORY, RECTAL RECTAL ONCE AS NEEDED
Status: DISCONTINUED | OUTPATIENT
Start: 2023-01-04 | End: 2023-01-06 | Stop reason: HOSPADM

## 2023-01-04 RX ORDER — PROCHLORPERAZINE EDISYLATE 5 MG/ML
5 INJECTION INTRAMUSCULAR; INTRAVENOUS EVERY 6 HOURS PRN
Status: DISCONTINUED | OUTPATIENT
Start: 2023-01-04 | End: 2023-01-04

## 2023-01-04 RX ORDER — DIPHENOXYLATE HYDROCHLORIDE AND ATROPINE SULFATE 2.5; .025 MG/1; MG/1
1 TABLET ORAL 4 TIMES DAILY PRN
Status: DISCONTINUED | OUTPATIENT
Start: 2023-01-04 | End: 2023-01-06 | Stop reason: HOSPADM

## 2023-01-04 RX ADMIN — ACETAMINOPHEN 650 MG: 325 TABLET, FILM COATED ORAL at 05:01

## 2023-01-04 RX ADMIN — DOCUSATE SODIUM 200 MG: 100 CAPSULE, LIQUID FILLED ORAL at 11:01

## 2023-01-04 RX ADMIN — KETOROLAC TROMETHAMINE 30 MG: 30 INJECTION, SOLUTION INTRAMUSCULAR; INTRAVENOUS at 11:01

## 2023-01-04 RX ADMIN — OXYCODONE HYDROCHLORIDE AND ACETAMINOPHEN 1 TABLET: 5; 325 TABLET ORAL at 10:01

## 2023-01-04 RX ADMIN — PRENATAL VIT W/ FE FUMARATE-FA TAB 27-0.8 MG 1 TABLET: 27-0.8 TAB at 11:01

## 2023-01-04 RX ADMIN — ACETAMINOPHEN 650 MG: 325 TABLET, FILM COATED ORAL at 08:01

## 2023-01-04 RX ADMIN — KETOROLAC TROMETHAMINE 30 MG: 30 INJECTION, SOLUTION INTRAMUSCULAR; INTRAVENOUS at 05:01

## 2023-01-04 NOTE — ANESTHESIA PROCEDURE NOTES
Spinal    Diagnosis: IUP  Patient location during procedure: OR  Start time: 1/3/2023 7:19 PM  Timeout: 1/3/2023 7:19 PM  End time: 1/3/2023 7:19 PM    Staffing  Authorizing Provider: Jessi Manuel MD  Performing Provider: Jessi Manuel MD    Preanesthetic Checklist  Completed: patient identified, IV checked, site marked, risks and benefits discussed, surgical consent, monitors and equipment checked, pre-op evaluation and timeout performed  Spinal Block  Patient position: sitting  Prep: ChloraPrep  Patient monitoring: heart rate, cardiac monitor, continuous pulse ox and frequent blood pressure checks  Approach: midline  Location: L3-4  Injection technique: single shot  CSF Fluid: clear free-flowing CSF  Needle  Needle type: pencil-tip   Needle gauge: 25 G  Needle length: 5 in  Additional Documentation: incremental injection  Needle localization: anatomical landmarks  Assessment  Sensory level: T4   Dermatomal levels determined by pinch or prick  Ease of block: easy  Patient's tolerance of the procedure: comfortable throughout block

## 2023-01-04 NOTE — PLAN OF CARE
Assessment WNL. Fundus midline, firm, at umb with light rubra. Helped pt latch infant. Breastfeeding well. VSS. No distress noted.

## 2023-01-04 NOTE — ANESTHESIA POSTPROCEDURE EVALUATION
Anesthesia Post Evaluation    Patient: Michelle Hernandez    Procedure(s) Performed: Procedure(s) (LRB):   SECTION (N/A)    Final Anesthesia Type: spinal      Patient location during evaluation: labor & delivery  Patient participation: Yes- Able to Participate  Level of consciousness: awake and alert  Post-procedure vital signs: reviewed and stable  Pain management: adequate  Airway patency: patent  MAXIMO mitigation strategies: Multimodal analgesia  PONV status at discharge: No PONV      Cardiovascular status: blood pressure returned to baseline  Respiratory status: unassisted and spontaneous ventilation  Follow-up not needed.          Vitals Value Taken Time   BP 88/56 23   Temp 36.4 °C (97.6 °F) 23   Pulse 71 23   Resp 18 23   SpO2 98 % 23         Event Time   Out of Recovery 2023 23:20:00         Pain/Sebastian Score: Pain Rating Prior to Med Admin: 4 (2023  8:59 AM)  Pain Rating Post Med Admin: 0 (2023  6:12 AM)

## 2023-01-04 NOTE — L&D DELIVERY NOTE
[facility] - Labor & Delivery   Section   Operative Note    SUMMARY     Procedure:   1. Primary  Section via Pfannenstiel skin incision    Indications:   1. non-reassuring fetal status    Pre-operative Diagnosis:   1. IUP at 40 week 6 day pregnancy  2. Nonreassuring fetal status  3. PROM  4. Anemia   5. Polyhydramnios   6. H/o HSV (negative SSE)  7. Hepatitis B (VL <6)    Post-operative Diagnosis:   1-7. Same  8. S/p primary  section  9. Postpartum hemorrhage     Surgeon: Brenda Osborne MD      Assistants: Luci Ingram MD - PGY-2    Anesthesia: Spinal anesthesia    Findings:    1. Single viable  female infant, with Apgar scores 8/9, weight 3650g.   2. Normal appearing uterus, ovaries, and fallopian tubes.  3. Normal placenta  4. Excellent hemostasis following closure of each tissue layer     Estimated Blood Loss:  1195 mL           Total IV Fluids: See anesthesia records      UOP: 100 mL    Specimens:   1. Single viable female infant  2. Placenta, which was discarded     PreOp CBC:   Lab Results   Component Value Date    WBC 7.82 2023    HGB 13.0 2023    HCT 36.5 (L) 2023     (H) 2023     (L) 2023                     Complications:  None; patient tolerated the procedure well.           Disposition: PACU - hemodynamically stable.           Condition: stable    Procedure Details   The patient was seen in her labor room. She was noted to have recurrent late decelerations unresponsive to resuscitation (fluid bolus, pitocin pause, maternal repositioning), followed by a 5 min prolonged deceleration. Due to fetal intolerance patient was rolled to the OR and urgent  delivery recommended. The risks, benefits, complications, treatment options, and expected outcomes were discussed with the patient. The patient concurred with the proposed plan, giving informed consent. The patient was taken to Operating Room, identified as Michelle Hernandez and  the procedure verified as primary  Delivery. A Time Out was held and the above information confirmed.    After induction of anesthesia, the patient was prepped and draped in the usual sterile manner while placed in a dorsal supine position with a left lateral tilt. A infante catheter was also placed per nursing. SCDs were on and cycling. Preoperative antibiotics Ancef 2 g and azithromycin 500 mg were administered. An allis test was performed confirming adequate anesthesia. A Pfannenstiel incision was made and carried down through the subcutaneous tissue to the fascia. Fascial incision was made and extended transversely. The fascia was grasped with kocher clamps and  from the underlying rectus tissue superiorly and inferiorly. The peritoneum was identified, found to be free of adherent bowel, and entered bluntly. Peritoneal incision was extended longitudinally. The vesico-uterine peritoneum was identified, and bladder blade was inserted. The vesico-uterine peritoneum was incised transversely and the bladder flap was bluntly freed from the lower uterine segment. The bladder blade was reinserted to keep the bladder out of the operative field. A low transverse uterine incision was made with knife and extended with cephalocaudad traction. The amniotic sac was ruptured with the hysterotomy and the infant was noted to be in cephalic presentation. The fetal head was brought to the incision and elevated out of the pelvis. The patient delivered a single viable female infant without difficulty.  Infant weighed 3650 grams with Apgar scores of 8/9 at one and five minutes respectively. After the umbilical cord was clamped and cut, cord blood was obtained for evaluation. The placenta was removed intact, appeared normal, and was discarded. The uterus was exteriorized. The uterine incision was closed with running locked sutures of 1 chromic. Bleeding was noted along the left apex of the hysterotomy and a figure of 8  "of 1 chromic was placed. Hemostasis was observed. The uterine outline, tubes and ovaries appeared normal. The abdominal cavity was swept with moist laps to remove clots. The uterus was returned to the abdominal cavity. Incision was reinspected and good hemostasis was noted. The abdominal cavity was again swept with moist laps to remove clots. The subfascial space was inspected and excellent hemostasis achieved. The fascia was then reapproximated with running sutures of 1 PDS. The subcutaneous fat was reapproximated with 2-0 vicryl, and skin was reapproximated with 4-0 monocryl.    Instrument, sponge, and needle counts were correct prior the abdominal closure and at the conclusion of the case.     Pt tolerated procedure well and was in stable condition after the procedure.      **NOTE: This patient is a candidate for trial of labor after  delivery**      Luci Ingram MD  OBGYN, PGY-2           Specimens:   Specimen (24h ago, onward)      None               Delivery Information for Miguel Hernandez    Birth information:  YOB: 2025   Time of birth: 7:28 PM   Sex: female   Head Delivery Date/Time: 1/3/2023  7:28 PM   Delivery type: , Low Transverse   Gestational Age: 40w6d    Delivery Providers    Delivering clinician: Brenda Osborne MD   Provider Role    MD Reema Levy, JOSHUA Priest, [facility]     MD Blanca Ibrahim, RN               Measurements    Weight: 3650 g  Weight (lbs): 8 lb 0.8 oz  Length: 53.3 cm  Length (in): 21"  Head circumference: 36.2 cm  Chest circumference: 33.7 cm         Apgars    Living status: Living  Apgars:  1 min.:  5 min.:  10 min.:  15 min.:  20 min.:    Skin color:  0  1       Heart rate:  2  2       Reflex irritability:  2  2       Muscle tone:  2  2       Respiratory effort:  2  2       Total:  8  9       Apgars assigned by: NICU         Operative Delivery    Forceps attempted?: " No  Vacuum extractor attempted?: No         Shoulder Dystocia    Shoulder dystocia present?: No           Presentation    Presentation: Vertex  Position: Occiput Anterior           Interventions/Resuscitation    Method: NICU Attended       Cord    Vessels: 3 vessels  Complications: Nuchal  Nuchal Intervention: reduced  Nuchal Cord Description: loose nuchal cord  Number of Loops: 1  Delayed Cord Clamping?: No  Cord Clamped Date/Time: 1/3/2023  7:28 PM  Cord Blood Disposition: Sent with Baby  Gases Sent?: Yes  Stem Cell Collection (by MD): No       Placenta    Placenta delivery date/time: 1/3/2023 1929  Placenta removal: Manual removal  Placenta appearance: Intact  Placenta disposition: discarded           Labor Events:       labor: No     Labor Onset Date/Time:         Dilation Complete Date/Time:         Start Pushing Date/Time:         Start Pushing Date/Time:       Rupture Date/Time: 23         Rupture type:            Fluid Amount:         Fluid Color: Clear        Fluid Odor:         Membrane Status: SRM (Spontaneous Rupture)                 steroids:       Antibiotics given for GBS: No     Induction:       Indications for induction:        Augmentation: oxytocin     Indications for augmentation: Ineffective Contraction Pattern     Labor complications: Fetal Intolerance     Additional complications:          Cervical ripening:                     Delivery:      Episiotomy:       Indication for Episiotomy:       Perineal Lacerations:   Repaired:      Periurethral Laceration:   Repaired:     Labial Laceration:   Repaired:     Sulcus Laceration:   Repaired:     Vaginal Laceration:   Repaired:     Cervical Laceration:   Repaired:     Repair suture:       Repair # of packets:       Last Value - EBL - Nursing (mL):       Sum - EBL - Nursing (mL): 0     Last Value - EBL - Anesthesia (mL):        Calculated QBL (mL): 1195        Vaginal Sweep Performed:       Surgicount Correct:         Other  providers:       Anesthesia    Method: Epidural          Details (if applicable):  Trial of Labor Yes    Categorization: Primary    Priority: Urgent   Indications for : Failed induction   Incision Type: low transverse     Additional  information:  Forceps:    Vacuum:    Breech:    Observed anomalies    Other (Comments):

## 2023-01-04 NOTE — PLAN OF CARE
Pt pain controlled with oral medications. Tolerating regular diet. Voiding spontaneously without difficulty and passing flatus. Fundus firm; midline; 1 cm below umbilicus. Lochia scant. S/S of post partum hemorrhage and preeclampsia reviewed pt verbalized understanding. Bonding appropriately with .  VSS.

## 2023-01-04 NOTE — TRANSFER OF CARE
"Anesthesia Transfer of Care Note    Patient: Michelle Hernandez    Procedure(s) Performed: * No procedures listed *    Patient location: Labor and Delivery    Transport from OR: Transported from OR on room air with adequate spontaneous ventilation    Post pain: adequate analgesia    Post assessment: no apparent anesthetic complications and tolerated procedure well    Post vital signs: stable    Level of consciousness: awake and alert    Nausea/Vomiting: no nausea/vomiting    Complications: none    Transfer of care protocol was followed      Last vitals:   Visit Vitals  /63   Pulse 84   Temp 37 °C (98.6 °F) (Oral)   Resp 18   Ht 5' 1" (1.549 m)   Wt 68.1 kg (150 lb 2.1 oz)   LMP 03/22/2022   SpO2 98%   Breastfeeding No   BMI 28.37 kg/m²     "

## 2023-01-04 NOTE — LACTATION NOTE
01/04/23 1000   Maternal Assessment   Breast Shape Bilateral:;round   Nipples Bilateral:;everted   Maternal Infant Feeding   Maternal Emotional State assist needed   Latch Assistance no  (LC offered assistance, pt declined at this time)   Community Referrals   Community Referrals support group;pediatric care provider;outpatient lactation program  (to call; will give Rx and [employer] info for pump)     Basic lactation education reviewed. Patient states she just fed baby and wants to eat breakfast now. Pt states she does not have milk, educated on colostrum and volume expectations and milk volume to increase day 3-5. LC offered to help latch baby and evaluate milk transfer, mom declines at this time. Baby showing cues, patient asked LC to swaddled baby for family member at bedside. LC number on board, to call when ready for help and will bring Rx and [employer] information to obtain pump for home use. Patient states she was told the hospital will give her a pump- LC explained hospital provides use of a hospital breast pump while inpatient, then she can rent a pump if she desires. Otherwise she is to contact insurance or DME to order pump through her insurance carrier.

## 2023-01-04 NOTE — PROGRESS NOTES
POSTPARTUM PROGRESS NOTE    Subjective:     PPD/POD#: 1   Procedure: Primary LTCS   EGA: 40w6d   N/V: No   F/C: No   Abd Pain: Mild, well-controlled with oral pain medication   Lochia: Mild   Voiding: Yes, via infante   Ambulating: No   Bowel fnc: No   Breastfeeding: Yes   Contraception: Per primary OB   Circumcision: N/A, female     Objective:      Temp:  [96 °F (35.6 °C)-98.8 °F (37.1 °C)] 98.6 °F (37 °C)  Pulse:  [] 73  Resp:  [17-18] 17  SpO2:  [95 %-100 %] 97 %  BP: ()/(54-78) 102/63    Lung: Normal respiratory effort   Abdomen: Soft, appropriately tender   Uterus: Firm, no fundal tenderness   Incision: Bandage in place without shadowing   : Infante in place   Extremities: Bilateral trace edema     Lab Review    No results for input(s): NA, K, CL, CO2, BUN, CREATININE, GLU, PROT, BILITOT, ALKPHOS, ALT, AST, MG, PHOS in the last 168 hours.    Recent Labs   Lab 01/03/23  0921   WBC 7.82   HGB 13.0   HCT 36.5*   *   *           I/O    Intake/Output Summary (Last 24 hours) at 1/4/2023 0611  Last data filed at 1/4/2023 0400  Gross per 24 hour   Intake 2248.59 ml   Output 2695 ml   Net -446.41 ml          Assessment and Plan:   Postpartum care:  - Patient doing well.  - Continue routine management and advances.    Varicella Non-Immune  - Will need postpartum VZV    GTP  -Plts 149     HBV  -VL <6  -antiviral therapy not indicated    Marcio Silverman MD PGY-1  Obstetrics and Gynecology

## 2023-01-04 NOTE — LACTATION NOTE
01/04/23 1200   Maternal Assessment   Breast Shape Bilateral:;round   Breast Density Bilateral:;soft   Areola Bilateral:   Nipples Bilateral:;everted   Maternal Infant Feeding   Maternal Emotional State assist needed   Latch Assistance yes   Community Referrals   Community Referrals support group;pediatric care provider;outpatient lactation program     RN called to room to check latch; baby latched shallow, LC assisted with deeper latch. Baby nurses well with breast compression/ stimulation, swallows observed. Encouraged mom to help baby stay actively nursing at breast. Reviewed day 1-3 expectations and basic lactation education. Rx and THS info given to call for pump for home.

## 2023-01-04 NOTE — DISCHARGE INSTRUCTIONS
Discharge Instructions    The AAP recommends exclusive breastfeeding for about the first 6 months of age and as solid foods are introduced, continued breastfeeding  for at least 2 years or longer.    Feed the baby at the earliest sign of hunger or comfort (see signs on the back cover of the Mother's Breastfeeding Guide)  Hands to mouth, sucking motions  Rooting or searching for something to suck on  Dont wait for crying - it is a sign of distress    The feedings may be 8-12 times per 24hrs and will not follow a schedule  Avoid pacifiers and bottles for the first 4 weeks  Alternate the breast you start the feeding with, or start with the breast that feels the fullest  Switch breasts when the baby takes himself off the breast or falls asleep  Keep offering breasts until the baby looks full, no longer gives hunger signs, and stays asleep when placed on his back in the crib  If the baby is sleepy and wont wake for a feeding, put the baby skin-to-skin dressed in a diaper against the mothers bare chest  Sleep with your baby in your room near you  The baby should be positioned and latched on to the breast correctly  Chest-to-chest, chin in the breast  Babys lips are flipped outward  Babys mouth is stretched open wide like a shout  Babys sucking should feel like tugging to the mother  The baby should be drinking at the breast:  You should hear swallowing or gulping throughout the feeding  You should see milk on the babys lips when he comes off the breast  Your breasts should be softer when the baby is finished feeding  The baby should look relaxed at the end of feedings  After the 4th day and your milk is in:  The babys poop should turn bright yellow and be loose, watery, and seedy  The baby should have at least 3-4 poops the size of the palm of your hand per day  The baby should have at least 5-6 wet diapers per day  The urine should be light yellow in color  You should drink when you are thirsty and eat a  healthy diet when you are    hungry.     Take naps to get the rest you need.   Take medications and/or drink alcohol only with permission of your obstetrician    or the babys pediatrician.  You can also call the Infant Risk Center,   ([phone]), Monday-Friday, 8am-5pm Central time, to get the most   up-to-date evidence-based information on the use of medications during   pregnancy and breastfeeding.      Complete the First Alert form in the Mother's Breastfeeding Guide 3-5 days after the baby's birth.  Please call the Breastfeeding Warmline ([phone]) or the baby's pediatrician if you have any concerns.    The baby should be examined by a pediatrician at 3-5 days of age and again at 2 weeks of age.  Once your milk production increases, the baby should be gaining at least ½ - 1oz each day and should be back to birthweight no later than 10-14 days of age.  If this is not the case, please call the Breastfeeding Warmline ([phone]) for assistance and support.    Community Resources    [facility] Breastfeeding Warmline: [phone]   Local [facility] clinics: provide incentives and breastpumps to eligible mothers 5-944-110-BABY  La Leche League International (LLLI):  mother-to-mother support group website        www.lll.SchoolMint  [facility] La Leche League mother-to-mother support groups:        www.lllMakstr.Primekss        La Leche League [facility]   Dr. Andrew Mon website for latch videos and general information:        www.breastfeedinginc.ca  [facility]ant Risk Center is a call center that provides information about the safety of taking medications while breastfeeding.  Call 4-980-890-4427, M-F, 8am-5pm, [place].  International Lactation Consultant Association provides resources for assistance:        www.ilca.org  [place] Breastfeeding Coalition provides informationand resources for parents  and the community    www.LaBreastfeedingSupport.org  Destiney Barrera is a mom-to-mom support group:                              www.nolanesting.com//breastfeedng-support/  [employer] for Healthy Babies:  7-237-449-BABY(1784)  [employer]: a breastfeeding support group for women of color, 801.385.1137

## 2023-01-05 LAB
BASOPHILS # BLD AUTO: 0.04 K/UL (ref 0–0.2)
BASOPHILS NFR BLD: 0.3 % (ref 0–1.9)
DIFFERENTIAL METHOD: ABNORMAL
EOSINOPHIL # BLD AUTO: 0.1 K/UL (ref 0–0.5)
EOSINOPHIL NFR BLD: 1.2 % (ref 0–8)
ERYTHROCYTE [DISTWIDTH] IN BLOOD BY AUTOMATED COUNT: 15.9 % (ref 11.5–14.5)
HCT VFR BLD AUTO: 25.8 % (ref 37–48.5)
HGB BLD-MCNC: 8.8 G/DL (ref 12–16)
IMM GRANULOCYTES # BLD AUTO: 0.06 K/UL (ref 0–0.04)
IMM GRANULOCYTES NFR BLD AUTO: 0.5 % (ref 0–0.5)
LYMPHOCYTES # BLD AUTO: 2.6 K/UL (ref 1–4.8)
LYMPHOCYTES NFR BLD: 21.9 % (ref 18–48)
MCH RBC QN AUTO: 35.8 PG (ref 27–31)
MCHC RBC AUTO-ENTMCNC: 34.1 G/DL (ref 32–36)
MCV RBC AUTO: 105 FL (ref 82–98)
MONOCYTES # BLD AUTO: 1 K/UL (ref 0.3–1)
MONOCYTES NFR BLD: 8.4 % (ref 4–15)
NEUTROPHILS # BLD AUTO: 8.2 K/UL (ref 1.8–7.7)
NEUTROPHILS NFR BLD: 67.7 % (ref 38–73)
NRBC BLD-RTO: 0 /100 WBC
PLATELET # BLD AUTO: 120 K/UL (ref 150–450)
PMV BLD AUTO: 9.8 FL (ref 9.2–12.9)
RBC # BLD AUTO: 2.46 M/UL (ref 4–5.4)
WBC # BLD AUTO: 12.07 K/UL (ref 3.9–12.7)

## 2023-01-05 PROCEDURE — 36415 COLL VENOUS BLD VENIPUNCTURE: CPT

## 2023-01-05 PROCEDURE — 25000003 PHARM REV CODE 250

## 2023-01-05 PROCEDURE — 85025 COMPLETE CBC W/AUTO DIFF WBC: CPT

## 2023-01-05 PROCEDURE — 11000001 HC ACUTE MED/SURG PRIVATE ROOM

## 2023-01-05 RX ORDER — ACETAMINOPHEN 500 MG
1000 TABLET ORAL EVERY 6 HOURS
Status: DISCONTINUED | OUTPATIENT
Start: 2023-01-05 | End: 2023-01-06 | Stop reason: HOSPADM

## 2023-01-05 RX ORDER — OXYCODONE HYDROCHLORIDE 5 MG/1
5 TABLET ORAL EVERY 6 HOURS PRN
Status: DISCONTINUED | OUTPATIENT
Start: 2023-01-05 | End: 2023-01-06 | Stop reason: HOSPADM

## 2023-01-05 RX ORDER — OXYCODONE HYDROCHLORIDE 5 MG/1
10 TABLET ORAL EVERY 6 HOURS PRN
Status: DISCONTINUED | OUTPATIENT
Start: 2023-01-05 | End: 2023-01-06 | Stop reason: HOSPADM

## 2023-01-05 RX ADMIN — DOCUSATE SODIUM 200 MG: 100 CAPSULE, LIQUID FILLED ORAL at 09:01

## 2023-01-05 RX ADMIN — IBUPROFEN 800 MG: 400 TABLET ORAL at 08:01

## 2023-01-05 RX ADMIN — ACETAMINOPHEN 1000 MG: 500 TABLET, FILM COATED ORAL at 11:01

## 2023-01-05 RX ADMIN — CHLORHEXIDINE GLUCONATE 0.12% ORAL RINSE 10 ML: 1.2 LIQUID ORAL at 08:01

## 2023-01-05 RX ADMIN — IBUPROFEN 800 MG: 400 TABLET ORAL at 12:01

## 2023-01-05 RX ADMIN — DOCUSATE SODIUM 200 MG: 100 CAPSULE, LIQUID FILLED ORAL at 12:01

## 2023-01-05 RX ADMIN — OXYCODONE AND ACETAMINOPHEN 1 TABLET: 10; 325 TABLET ORAL at 10:01

## 2023-01-05 RX ADMIN — IBUPROFEN 800 MG: 400 TABLET ORAL at 07:01

## 2023-01-05 RX ADMIN — OXYCODONE AND ACETAMINOPHEN 1 TABLET: 10; 325 TABLET ORAL at 05:01

## 2023-01-05 RX ADMIN — OXYCODONE HYDROCHLORIDE 10 MG: 5 TABLET ORAL at 04:01

## 2023-01-05 RX ADMIN — DOCUSATE SODIUM 200 MG: 100 CAPSULE, LIQUID FILLED ORAL at 08:01

## 2023-01-05 RX ADMIN — PRENATAL VIT W/ FE FUMARATE-FA TAB 27-0.8 MG 1 TABLET: 27-0.8 TAB at 08:01

## 2023-01-05 NOTE — PLAN OF CARE
Assessment WNL. Fundus firm, midline, at umb with scant rubra. Pt alert and ambulating well. Reviewed poc. Pain managed with medications per mar. Breastfeeding well. No distress noted.

## 2023-01-05 NOTE — LACTATION NOTE
01/05/23 0930   Breasts WDL   Breast WDL WDL   Maternal Feeding Assessment   Maternal Emotional State independent   Latch Assistance   (to call)   Reproductive Interventions   Breast Care: Breastfeeding open to air   Breastfeeding Assistance feeding on demand promoted;feeding cue recognition promoted   Breastfeeding Support maternal rest encouraged;maternal nutrition promoted;maternal hydration promoted;lactation counseling provided;infant-mother separation minimized;encouragement provided;diary/feeding log utilized     Lactation rounds. Basic education reviewed. Pt encouraged to call for latch assistance.

## 2023-01-05 NOTE — PROGRESS NOTES
POSTPARTUM PROGRESS NOTE    Subjective:     PPD/POD#: 2   Procedure: Primary LTCS   EGA: 40w6d   N/V: No   F/C: No   Abd Pain: Mild, well-controlled with oral pain medication   Lochia: Mild   Voiding: Yes   Ambulating: Yes   Bowel fnc: Yes   Breastfeeding: Yes   Contraception: Per primary OB   Circumcision: N/A, female     Objective:      Temp:  [97.6 °F (36.4 °C)-98.4 °F (36.9 °C)] 98 °F (36.7 °C)  Pulse:  [71-87] 87  Resp:  [16-18] 17  SpO2:  [96 %-98 %] 97 %  BP: (85-93)/(53-58) 93/58    Lung: Normal respiratory effort   Abdomen: Soft, appropriately tender   Uterus: Firm, no fundal tenderness   Incision: Bandage in place without shadowing   : Deferred   Extremities: Bilateral trace edema     Lab Review    No results for input(s): NA, K, CL, CO2, BUN, CREATININE, GLU, PROT, BILITOT, ALKPHOS, ALT, AST, MG, PHOS in the last 168 hours.    Recent Labs   Lab 01/03/23  0921 01/05/23  0529   WBC 7.82 12.07   HGB 13.0 8.8*   HCT 36.5* 25.8*   * 105*   * 120*           I/O    Intake/Output Summary (Last 24 hours) at 1/5/2023 0706  Last data filed at 1/4/2023 1700  Gross per 24 hour   Intake --   Output 2100 ml   Net -2100 ml          Assessment and Plan:   Postpartum care:  - Patient doing well.  - Continue routine management and advances.    Varicella Non-Immune  - Will need postpartum VZV    GTP  -Plts 149     HBV  -VL <6  -antiviral therapy not indicated    Marcio Silverman MD PGY-1  Obstetrics and Gynecology

## 2023-01-06 VITALS
HEART RATE: 77 BPM | TEMPERATURE: 98 F | BODY MASS INDEX: 28.35 KG/M2 | SYSTOLIC BLOOD PRESSURE: 96 MMHG | DIASTOLIC BLOOD PRESSURE: 59 MMHG | WEIGHT: 150.13 LBS | OXYGEN SATURATION: 97 % | HEIGHT: 61 IN | RESPIRATION RATE: 17 BRPM

## 2023-01-06 PROBLEM — B19.10 HEPATITIS B: Status: ACTIVE | Noted: 2023-01-06

## 2023-01-06 PROBLEM — O99.119 GESTATIONAL THROMBOCYTOPENIA: Status: ACTIVE | Noted: 2023-01-06

## 2023-01-06 PROBLEM — D69.6 GESTATIONAL THROMBOCYTOPENIA: Status: ACTIVE | Noted: 2023-01-06

## 2023-01-06 PROCEDURE — 25000003 PHARM REV CODE 250

## 2023-01-06 RX ORDER — FERROUS SULFATE 325(65) MG
325 TABLET, DELAYED RELEASE (ENTERIC COATED) ORAL DAILY
Qty: 30 TABLET | Refills: 3 | Status: SHIPPED | OUTPATIENT
Start: 2023-01-06

## 2023-01-06 RX ORDER — POLYETHYLENE GLYCOL 3350 17 G/17G
17 POWDER, FOR SOLUTION ORAL DAILY
Status: DISCONTINUED | OUTPATIENT
Start: 2023-01-06 | End: 2023-01-06 | Stop reason: HOSPADM

## 2023-01-06 RX ORDER — LANOLIN ALCOHOL/MO/W.PET/CERES
1 CREAM (GRAM) TOPICAL DAILY
Status: DISCONTINUED | OUTPATIENT
Start: 2023-01-06 | End: 2023-01-06 | Stop reason: HOSPADM

## 2023-01-06 RX ORDER — POLYETHYLENE GLYCOL 3350 17 G/17G
17 POWDER, FOR SOLUTION ORAL DAILY
Qty: 510 G | Refills: 0 | Status: SHIPPED | OUTPATIENT
Start: 2023-01-06 | End: 2023-03-30

## 2023-01-06 RX ADMIN — IBUPROFEN 800 MG: 400 TABLET ORAL at 02:01

## 2023-01-06 RX ADMIN — IBUPROFEN 800 MG: 400 TABLET ORAL at 11:01

## 2023-01-06 RX ADMIN — DOCUSATE SODIUM 200 MG: 100 CAPSULE, LIQUID FILLED ORAL at 09:01

## 2023-01-06 RX ADMIN — POLYETHYLENE GLYCOL 3350 17 G: 17 POWDER, FOR SOLUTION ORAL at 09:01

## 2023-01-06 RX ADMIN — OXYCODONE HYDROCHLORIDE 10 MG: 5 TABLET ORAL at 02:01

## 2023-01-06 RX ADMIN — PRENATAL VIT W/ FE FUMARATE-FA TAB 27-0.8 MG 1 TABLET: 27-0.8 TAB at 09:01

## 2023-01-06 RX ADMIN — FERROUS SULFATE TAB 325 MG (65 MG ELEMENTAL FE) 1 EACH: 325 (65 FE) TAB at 09:01

## 2023-01-06 RX ADMIN — ACETAMINOPHEN 1000 MG: 500 TABLET, FILM COATED ORAL at 05:01

## 2023-01-06 RX ADMIN — ACETAMINOPHEN 1000 MG: 500 TABLET, FILM COATED ORAL at 11:01

## 2023-01-06 RX ADMIN — OXYCODONE HYDROCHLORIDE 10 MG: 5 TABLET ORAL at 11:01

## 2023-01-06 NOTE — PROGRESS NOTES
POSTPARTUM PROGRESS NOTE    Subjective:     PPD/POD#: 3   Procedure: Primary LTCS   EGA: 40w6d   N/V: No   F/C: No   Abd Pain: Mild, well-controlled with oral pain medication   Lochia: Mild   Voiding: Yes   Ambulating: Yes   Bowel fnc: Yes   Breastfeeding: Yes   Contraception: Per primary OB   Circumcision: N/A, female     Objective:      Temp:  [97.6 °F (36.4 °C)-97.9 °F (36.6 °C)] 97.6 °F (36.4 °C)  Pulse:  [75-87] 77  Resp:  [18-56] 56  SpO2:  [95 %-96 %] 95 %  BP: (98-99)/(56-64) 98/64    Lung: Normal respiratory effort   Abdomen: Soft, appropriately tender   Uterus: Firm, no fundal tenderness   Incision: Bandage in place without shadowing   : Deferred   Extremities: Bilateral trace edema     Lab Review    No results for input(s): NA, K, CL, CO2, BUN, CREATININE, GLU, PROT, BILITOT, ALKPHOS, ALT, AST, MG, PHOS in the last 168 hours.    Recent Labs   Lab 01/03/23  0921 01/05/23  0529   WBC 7.82 12.07   HGB 13.0 8.8*   HCT 36.5* 25.8*   * 105*   * 120*           I/O  No intake or output data in the 24 hours ending 01/06/23 0731       Assessment and Plan:   Postpartum care:  - Patient doing well.  - Continue routine management and advances.    Varicella Non-Immune  - Will need postpartum VZV    GTP  -Plts 149     HBV  -VL <6  -antiviral therapy not indicated    Anemia/PPH  - H/H as above  - QBL: 1195mL  - asymptomatic  - iron/colace        Oliva Ireland MD  [facility]   OBGYN PGY1

## 2023-01-06 NOTE — PLAN OF CARE
Problem: Adult Inpatient Plan of Care  Goal: Plan of Care Review  Outcome: Met  Goal: Patient-Specific Goal (Individualized)  Outcome: Met  Goal: Absence of Hospital-Acquired Illness or Injury  Outcome: Met  Goal: Optimal Comfort and Wellbeing  Outcome: Met  Goal: Readiness for Transition of Care  Outcome: Met     Problem: Infection  Goal: Absence of Infection Signs and Symptoms  Outcome: Met     Problem:  Fall Injury Risk  Goal: Absence of Fall, Infant Drop and Related Injury  Outcome: Met     Problem: Bleeding (Labor)  Goal: Hemostasis  Outcome: Met     Problem: Change in Fetal Wellbeing (Labor)  Goal: Stable Fetal Wellbeing  Outcome: Met     Problem: Delayed Labor Progression (Labor)  Goal: Effective Progression to Delivery  Outcome: Met     Problem: Infection (Labor)  Goal: Absence of Infection Signs and Symptoms  Outcome: Met     Problem: Labor Pain (Labor)  Goal: Acceptable Pain Control  Outcome: Met     Problem: Uterine Tachysystole (Labor)  Goal: Normal Uterine Contraction Pattern  Outcome: Met     Problem: Breastfeeding  Goal: Effective Breastfeeding  Outcome: Met     Problem: Skin Injury Risk Increased  Goal: Skin Health and Integrity  Outcome: Met     Problem: Pain Acute  Goal: Acceptable Pain Control and Functional Ability  Outcome: Met     Problem: Adjustment to Role Transition (Postpartum  Delivery)  Goal: Successful Maternal Role Transition  Outcome: Met     Problem: Bleeding (Postpartum  Delivery)  Goal: Hemostasis  Outcome: Met     Problem: Infection (Postpartum  Delivery)  Goal: Absence of Infection Signs and Symptoms  Outcome: Met     Problem: Pain (Postpartum  Delivery)  Goal: Acceptable Pain Control  Outcome: Met     Problem: Postoperative Nausea and Vomiting (Postpartum  Delivery)  Goal: Nausea and Vomiting Relief  Outcome: Met     Problem: Postoperative Urinary Retention (Postpartum  Delivery)  Goal: Effective Urinary  Elimination  Outcome: Met

## 2023-01-06 NOTE — LACTATION NOTE
01/06/23 1030   Maternal Assessment   Breast Shape Bilateral:;round   Breast Density Bilateral:;soft   Maternal Infant Feeding   Maternal Emotional State independent   Infant Positioning cross-cradle   Pain with Feeding no   Latch Assistance yes     Lactation rounds. Discharge education reviewed. Pt has no questions or concerns

## 2023-01-06 NOTE — ANESTHESIA POSTPROCEDURE EVALUATION
Anesthesia Post Evaluation    Patient: Michelle Hernandez    Procedure(s) Performed: Procedure(s) (LRB):   SECTION (N/A)          Patient location during evaluation: labor & delivery  Patient participation: Yes- Able to Participate  Level of consciousness: awake and alert  Post-procedure vital signs: reviewed and stable  Pain management: adequate  Airway patency: patent    PONV status at discharge: No PONV  Anesthetic complications: no      Cardiovascular status: blood pressure returned to baseline and stable  Respiratory status: room air, unassisted and spontaneous ventilation  Hydration status: euvolemic  Follow-up not needed.          Vitals Value Taken Time   BP 98/64 23 0017   Temp 36.4 °C (97.6 °F) 23 0017   Pulse 77 23 0017   Resp 56 23 0245   SpO2 95 % 23 0017         Event Time   Out of Recovery 2023 23:20:00         Pain/Sebastian Score: Pain Rating Prior to Med Admin: 6 (2023  2:45 AM)  Pain Rating Post Med Admin: 3 (2023  3:40 AM)

## 2023-01-06 NOTE — ANESTHESIA POSTPROCEDURE EVALUATION
Anesthesia Post Evaluation    Patient: Michelle Hernandez    Procedure(s) Performed: Procedure(s) (LRB):   SECTION (N/A)    Final Anesthesia Type: spinal      Patient location during evaluation: labor & delivery  Patient participation: Yes- Able to Participate  Level of consciousness: awake and alert  Post-procedure vital signs: reviewed and stable  Pain management: adequate  Airway patency: patent  MAXIMO mitigation strategies: Multimodal analgesia  PONV status at discharge: No PONV      Cardiovascular status: blood pressure returned to baseline  Respiratory status: unassisted and spontaneous ventilation  Follow-up not needed.          Vitals Value Taken Time   BP 98/64 23 0017   Temp 36.4 °C (97.6 °F) 23 0017   Pulse 77 23 0017   Resp 56 23 0245   SpO2 95 % 23 0017         Event Time   Out of Recovery 2023 23:20:00         Pain/Sebastian Score: Pain Rating Prior to Med Admin: 6 (2023  2:45 AM)  Pain Rating Post Med Admin: 3 (2023  3:40 AM)

## 2023-01-06 NOTE — DISCHARGE SUMMARY
Delivery Discharge Summary  Obstetrics      Primary OB Clinician: Joel Kent Jr., MD      Admission date: 1/3/2023  Discharge date: 2023    Disposition: To home, self care    Discharge Diagnosis List:      Patient Active Problem List   Diagnosis    Genital HSV    Fetal pyelectasis    Anemia    S/p pLTCS (NRFHT)    PPH (postpartum hemorrhage)    Hepatitis B    Gestational thrombocytopenia       Procedure: , due to non-reassuring fetal heart tones    Hospital Course:  Michelle Hernandez is a 32 y.o. now , POD #3 who was admitted on 1/3/2023 at 40w6d for PROM. Pregnancy was complicated by mild polyhydramnios, HSV, hepatitis B. Patient was subsequently admitted to labor and delivery unit with signed consents.     Labor course was complicated by NRFHTs, and decision was made to proceed with delivery via  which was complicated by a post partum hemorrhage (QBL 1195mL). Patient's H/H dropped from 13/36 to 9/26. She was started on iron and colace for anemia.     Please see delivery note for further details. Her postpartum course was uncomplicated. On discharge day, patient's pain is controlled with oral pain medications. Pt is tolerating ambulation without SOB or CP, and regular diet without N/V. Reports lochia is mild. Denies any HA, vision changes, F/C, LE swelling. Denies any breast pain/soreness.    Pt in stable condition and ready for discharge. She has been instructed to start and/or continue medications and follow up with her obstetrics provider as listed below.    Pertinent studies:  CBC  Recent Labs   Lab 23  0921 23  0529   WBC 7.82 12.07   HGB 13.0 8.8*   HCT 36.5* 25.8*   * 105*   * 120*            Immunization History   Administered Date(s) Administered    Tdap 10/14/2022          Delivery:    Episiotomy:     Lacerations:     Repair suture:     Repair # of packets:     Blood loss (ml):       Birth information:  YOB: 2025   Time of birth:  7:28 PM   Sex: female   Delivery type: , Low Transverse   Gestational Age: 40w6d    Delivery Clinician:      Other providers:       Additional  information:  Forceps:    Vacuum:    Breech:    Observed anomalies      Living?:           APGARS  One minute Five minutes Ten minutes   Skin color:         Heart rate:         Grimace:         Muscle tone:         Breathing:         Totals: 8  9        Placenta: Delivered:       appearance    Patient Instructions:   Current Discharge Medication List        START taking these medications    Details   docusate sodium (COLACE) 100 MG capsule Take 1 capsule (100 mg total) by mouth 2 (two) times daily.  Qty: 60 capsule, Refills: 0      ferrous sulfate 325 (65 FE) MG EC tablet Take 1 tablet (325 mg total) by mouth once daily.  Qty: 30 tablet, Refills: 3      ibuprofen (ADVIL,MOTRIN) 800 MG tablet Take 1 tablet (800 mg total) by mouth every 8 (eight) hours.  Qty: 30 tablet, Refills: 1      oxyCODONE-acetaminophen (PERCOCET) 5-325 mg per tablet Take 1 tablet by mouth every 6 (six) hours as needed for Pain.  Qty: 16 each, Refills: 0    Comments: Quantity prescribed more than 7 day supply? No      polyethylene glycol (GLYCOLAX) 17 gram PwPk Take 17 g by mouth once daily.  Qty: 30 each, Refills: 0           CONTINUE these medications which have NOT CHANGED    Details   valACYclovir (VALTREX) 500 MG tablet TAKE 1 TABLET (500 MG TOTAL) BY MOUTH 2 (TWO) TIMES DAILY. FOR 5 DAYS  Qty: 60 tablet, Refills: 2             Discharge Procedure Orders   BREAST PUMP FOR HOME USE     Order Specific Question Answer Comments   Type of pump: Electric    Weight: 68.1 kg (150 lb 2.1 oz)    Length of need (1-99 months): 99      Diet Adult Regular     Diet Adult Regular     Lifting restrictions   Order Comments: No lifting more than infant for six weeks.     Other restrictions (specify):     No driving until:   Order Comments: Comfortable stepping on gas and brakes     Pelvic Rest   Order  Comments: Nothing in vagina, including intercourse, for at least six weeks     Notify your health care provider if you experience any of the following:  temperature >100.4     Notify your health care provider if you experience any of the following:  persistent nausea and vomiting or diarrhea     Notify your health care provider if you experience any of the following:  severe uncontrolled pain     Notify your health care provider if you experience any of the following:  redness, tenderness, or signs of infection (pain, swelling, redness, odor or green/yellow discharge around incision site)     Notify your health care provider if you experience any of the following:  severe persistent headache     Notify your health care provider if you experience any of the following:  persistent dizziness, light-headedness, or visual disturbances     Notify your health care provider if you experience any of the following:  increased confusion or weakness     Notify your health care provider if you experience any of the following:   Order Comments: Heavy vaginal bleeding, saturating more than two pads in one hour     Lifting restrictions   Order Comments: No lifting more than infant weight for 6 weeks.     No driving until:   Order Comments: Not taking narcotic medicine and feel comfortable stepping on the brakes.     Pelvic Rest   Order Comments: Nothing in vagina including intercourse for 6 weeks.     Notify your health care provider if you experience any of the following:  temperature >100.4     Notify your health care provider if you experience any of the following:  persistent nausea and vomiting or diarrhea     Notify your health care provider if you experience any of the following:  severe uncontrolled pain     Notify your health care provider if you experience any of the following:  redness, tenderness, or signs of infection (pain, swelling, redness, odor or green/yellow discharge around incision site)     Notify your health  care provider if you experience any of the following:  severe persistent headache     Notify your health care provider if you experience any of the following:  persistent dizziness, light-headedness, or visual disturbances     Notify your health care provider if you experience any of the following:  increased confusion or weakness     Notify your health care provider if you experience any of the following:   Order Comments: Heavy vaginal bleeding, saturating more than 2 pads in 1 hour.     Activity as tolerated        Follow-up Information       Joel Kent Jr, MD. Go in 2 week(s).    Specialties: Obstetrics, Obstetrics and Gynecology  Why: Postpartum incision check  Contact information:  [address] [address]  [phone]                              Oliva Ireland MD  [facility]   OBGYN PGY1

## 2023-01-07 NOTE — NURSING
D/C instructions given to patient regarding follow up appts, Rx prescribed by MD and S&S of preE and PPH. Pt stated understanding. Mom are when to follow up with OB. Pt in no distress awaiting transport for d/c.

## 2023-01-09 ENCOUNTER — PATIENT MESSAGE (OUTPATIENT)
Dept: OBSTETRICS AND GYNECOLOGY | Facility: OTHER | Age: 33
End: 2023-01-09
Payer: COMMERCIAL

## 2023-01-14 ENCOUNTER — NURSE TRIAGE (OUTPATIENT)
Dept: ADMINISTRATIVE | Facility: CLINIC | Age: 33
End: 2023-01-14
Payer: COMMERCIAL

## 2023-01-14 ENCOUNTER — HOSPITAL ENCOUNTER (EMERGENCY)
Facility: OTHER | Age: 33
Discharge: HOME | End: 2023-01-14
Attending: OBSTETRICS & GYNECOLOGY
Payer: COMMERCIAL

## 2023-01-14 VITALS
TEMPERATURE: 98 F | RESPIRATION RATE: 18 BRPM | SYSTOLIC BLOOD PRESSURE: 117 MMHG | DIASTOLIC BLOOD PRESSURE: 71 MMHG | HEART RATE: 76 BPM | OXYGEN SATURATION: 99 %

## 2023-01-14 DIAGNOSIS — M54.9 BACK PAIN, UNSPECIFIED BACK LOCATION, UNSPECIFIED BACK PAIN LATERALITY, UNSPECIFIED CHRONICITY: Primary | ICD-10-CM

## 2023-01-14 PROCEDURE — 99284 EMERGENCY DEPT VISIT MOD MDM: CPT

## 2023-01-14 PROCEDURE — 99283 PR EMERGENCY DEPT VISIT,LEVEL III: ICD-10-PCS | Mod: ,,, | Performed by: OBSTETRICS & GYNECOLOGY

## 2023-01-14 PROCEDURE — 25000003 PHARM REV CODE 250: Performed by: STUDENT IN AN ORGANIZED HEALTH CARE EDUCATION/TRAINING PROGRAM

## 2023-01-14 PROCEDURE — 25000003 PHARM REV CODE 250

## 2023-01-14 PROCEDURE — 99283 EMERGENCY DEPT VISIT LOW MDM: CPT | Mod: ,,, | Performed by: OBSTETRICS & GYNECOLOGY

## 2023-01-14 RX ORDER — HYDROCODONE BITARTRATE AND ACETAMINOPHEN 5; 325 MG/1; MG/1
1 TABLET ORAL EVERY 6 HOURS PRN
Qty: 10 TABLET | Refills: 0 | Status: SHIPPED | OUTPATIENT
Start: 2023-01-14 | End: 2023-03-30

## 2023-01-14 RX ORDER — CYCLOBENZAPRINE HCL 10 MG
10 TABLET ORAL NIGHTLY
Qty: 5 TABLET | Refills: 0 | Status: SHIPPED | OUTPATIENT
Start: 2023-01-14 | End: 2023-01-19

## 2023-01-14 RX ORDER — CYCLOBENZAPRINE HCL 5 MG
5 TABLET ORAL ONCE
Status: COMPLETED | OUTPATIENT
Start: 2023-01-14 | End: 2023-01-14

## 2023-01-14 RX ORDER — METHOCARBAMOL 750 MG/1
750 TABLET, FILM COATED ORAL 3 TIMES DAILY
Status: DISCONTINUED | OUTPATIENT
Start: 2023-01-14 | End: 2023-01-15 | Stop reason: HOSPADM

## 2023-01-14 RX ORDER — HYDROCODONE BITARTRATE AND ACETAMINOPHEN 5; 325 MG/1; MG/1
1 TABLET ORAL ONCE
Status: COMPLETED | OUTPATIENT
Start: 2023-01-14 | End: 2023-01-14

## 2023-01-14 RX ORDER — KETOROLAC TROMETHAMINE 10 MG/1
10 TABLET, FILM COATED ORAL ONCE
Status: COMPLETED | OUTPATIENT
Start: 2023-01-14 | End: 2023-01-14

## 2023-01-14 RX ADMIN — CYCLOBENZAPRINE HYDROCHLORIDE 5 MG: 5 TABLET, FILM COATED ORAL at 11:01

## 2023-01-14 RX ADMIN — KETOROLAC TROMETHAMINE 10 MG: 10 TABLET, FILM COATED ORAL at 10:01

## 2023-01-14 RX ADMIN — METHOCARBAMOL 750 MG: 750 TABLET ORAL at 10:01

## 2023-01-14 RX ADMIN — HYDROCODONE BITARTRATE AND ACETAMINOPHEN 1 TABLET: 5; 325 TABLET ORAL at 09:01

## 2023-01-15 NOTE — ED PROVIDER NOTES
Encounter Date: 2023       History     Chief Complaint   Patient presents with    Back Pain     Left Sided Back Pain and Left Leg Pain     HPI  Michelle Hernandez is a 32 y.o.  POD#11 at 40w6d presents complaining of back pain.     Patient presents with back pain that has been worsening over the last 3 days. Reports sharp stabbing back pain that radiates down the left leg. She said her pain improves with ibuprofen and Norco. She has been taking ibuprofen 800mg BID however ran out of the Norco 1 day ago. Denies any fevers, nausea, vomiting, muscle weakness or paresthesias.     Review of patient's allergies indicates:   Allergen Reactions    Shellfish derived Anaphylaxis     Shrimp, lobster, itchy, throat swelling     Past Medical History:   Diagnosis Date    Abnormal Pap smear of cervix     Herpes simplex virus (HSV) infection      Past Surgical History:   Procedure Laterality Date     SECTION N/A 2023    Procedure:  SECTION;  Surgeon: Brenda Osborne MD;  Location: [facility];  Service: OB/GYN;  Laterality: N/A;     Family History   Problem Relation Age of Onset    Colon cancer Maternal Grandmother     Breast cancer Neg Hx     Ovarian cancer Neg Hx      Social History     Tobacco Use    Smoking status: Never    Smokeless tobacco: Never   Substance Use Topics    Alcohol use: Not Currently    Drug use: Never     Review of Systems   Constitutional:  Negative for fever.   HENT:  Negative for sore throat.    Eyes:  Negative for visual disturbance.   Respiratory:  Negative for shortness of breath.    Cardiovascular:  Negative for chest pain.   Gastrointestinal:  Negative for nausea.   Genitourinary:  Negative for dysuria.   Musculoskeletal:  Positive for back pain.   Skin:  Negative for rash.   Neurological:  Negative for weakness, numbness and headaches.   Hematological:  Does not bruise/bleed easily.     Physical Exam     Initial Vitals   BP Pulse Resp Temp SpO2   23 2054 23  2054 01/14/23 2130 01/14/23 2054 01/14/23 2054   117/71 76 18 98.3 °F (36.8 °C) 99 %      MAP       --                Physical Exam    Vitals reviewed.  Constitutional: She appears well-developed and well-nourished. She is not diaphoretic. No distress.   HENT:   Head: Normocephalic and atraumatic.   Neck:   Normal range of motion.  Cardiovascular:  Normal rate and intact distal pulses.           Pulmonary/Chest: Breath sounds normal. No respiratory distress. She has no wheezes.   No swelling or bruising on the back.   Abdominal: Abdomen is soft. She exhibits no distension. There is no abdominal tenderness.   Incision healing well, no erythema or exudate. There is no rebound.   Musculoskeletal:         General: No edema. Normal range of motion.      Cervical back: Normal range of motion.     Neurological: She is alert and oriented to person, place, and time. She has normal strength. No sensory deficit.   Skin: Skin is warm and dry. No rash noted. No erythema.   Psychiatric: She has a normal mood and affect. Her behavior is normal. Judgment and thought content normal.       ED Course   Procedures  Labs Reviewed - No data to display       Imaging Results    None          Medications   methocarbamoL tablet 750 mg (750 mg Oral Given 1/14/23 2232)   HYDROcodone-acetaminophen 5-325 mg per tablet 1 tablet (1 tablet Oral Given 1/14/23 2130)   ketorolac tablet 10 mg (10 mg Oral Given 1/14/23 2232)   cyclobenzaprine tablet 5 mg (5 mg Oral Given 1/14/23 2311)     Medical Decision Making:   ED Management:  VSS   Exam unremarkable   Anesthesia consulted; no acute concerns, given Toradol/Robaxin   Patient pain attributed to MSK     Discharged with recommendations to continue alternating Tylenol/Ibuprofen. Also given short course Flexeril and Norco. Patient with f/u appointment 1/17. Patient to follow up with primary if need PT referral.     Geneva Ferro MD  PGY 2  Obstetrics and Gynecology           Attending Attestation:    Physician Attestation Statement for Resident:  As the supervising MD   Physician Attestation Statement: I have personally seen and examined this patient.   I agree with the above history.  -:   As the supervising MD I agree with the above PE.     As the supervising MD I agree with the above treatment, course, plan, and disposition.   -: Benign exam, no bruising on the back.  Anesthesia consulted.  Flexeril and norco sent to pharmacy.  Agree with discharge to home.  Will f/u with OB in 1 week.  I was personally present during the critical portions of the procedure(s) performed by the resident and was immediately available in the ED to provide services and assistance as needed during the entire procedure.   I have reviewed the following: old records at this facility.                           Clinical Impression:   Final diagnoses:  [M54.9] Back pain, unspecified back location, unspecified back pain laterality, unspecified chronicity (Primary)        ED Disposition Condition    Discharge Stable          ED Prescriptions       Medication Sig Dispense Start Date End Date Auth. Provider    cyclobenzaprine (FLEXERIL) 10 MG tablet Take 1 tablet (10 mg total) by mouth every evening. for 5 days 5 tablet 1/14/2023 1/19/2023 Karena Ferro MD    HYDROcodone-acetaminophen (NORCO) 5-325 mg per tablet Take 1 tablet by mouth every 6 (six) hours as needed for Pain. 10 tablet 1/14/2023 -- Karena Ferro MD          Follow-up Information    None          Karena Ferro MD  Resident  01/15/23 0017       Anna Maria MD  01/15/23 0641

## 2023-01-15 NOTE — TELEPHONE ENCOUNTER
Pt states csection x6 days, c/o increase in pain to left side of back, sharp pain, started after epidural, pain is increasing, 10/10, states cant walk without help, pt states out of pain meds. Per protocol, go to ED now. Advised to have another adult drive, take OTC meds for pain if needed. Pt verbalizes understanding.   Reason for Disposition   [1] SEVERE pain AND [2] not relieved by pain medications    Additional Information   Negative: Sounds like a life-threatening emergency to the triager   Negative: [1] Widespread rash AND [2] bright red, sunburn-like   Negative: Fever > 100.4 F (38.0 C)    Protocols used: Postpartum -  Symptoms-A-AH

## 2023-01-17 ENCOUNTER — POSTPARTUM VISIT (OUTPATIENT)
Dept: OBSTETRICS AND GYNECOLOGY | Facility: CLINIC | Age: 33
End: 2023-01-17
Payer: COMMERCIAL

## 2023-01-17 VITALS — DIASTOLIC BLOOD PRESSURE: 64 MMHG | BODY MASS INDEX: 24.66 KG/M2 | WEIGHT: 130.5 LBS | SYSTOLIC BLOOD PRESSURE: 104 MMHG

## 2023-01-17 DIAGNOSIS — M54.32 LEFT SCIATIC NERVE PAIN: Primary | ICD-10-CM

## 2023-01-17 PROCEDURE — 0503F PR POSTPARTUM CARE VISIT: ICD-10-PCS | Mod: S$GLB,,, | Performed by: OBSTETRICS & GYNECOLOGY

## 2023-01-17 PROCEDURE — 0503F POSTPARTUM CARE VISIT: CPT | Mod: S$GLB,,, | Performed by: OBSTETRICS & GYNECOLOGY

## 2023-01-17 PROCEDURE — 99999 PR PBB SHADOW E&M-EST. PATIENT-LVL III: CPT | Mod: PBBFAC,,, | Performed by: OBSTETRICS & GYNECOLOGY

## 2023-01-17 PROCEDURE — 99999 PR PBB SHADOW E&M-EST. PATIENT-LVL III: ICD-10-PCS | Mod: PBBFAC,,, | Performed by: OBSTETRICS & GYNECOLOGY

## 2023-01-17 RX ORDER — ZOSTER VACCINE RECOMBINANT, ADJUVANTED 50 MCG/0.5
KIT INTRAMUSCULAR ONCE
Qty: 1 EACH | Refills: 1 | Status: SHIPPED | OUTPATIENT
Start: 2023-01-17 | End: 2023-01-24

## 2023-01-20 NOTE — PROGRESS NOTES
Pt here for pp check.  Pain down L leg. Incision OK.    ROS:  GENERAL: No fever, chills, fatigability or weight loss.  VULVAR: No pain, no lesions and no itching.  VAGINAL: No relaxation, no itching, no discharge, no abnormal bleeding and no lesions.  ABDOMEN: No abdominal pain. Denies nausea. Denies vomiting. No diarrhea. No constipation  BREAST: Denies pain. No lumps. No discharge.  URINARY: No incontinence, no nocturia, no frequency and no dysuria.  CARDIOVASCULAR: No chest pain. No shortness of breath. No leg cramps.  NEUROLOGICAL: No headaches. No vision changes.  The remainder of the review of systems was negative.    PE:  General Appearance: normal weight And Well developed. Well nourished. In no acute distress.  Abdomen: normal weight No masses. No tenderness. INTACT INCISION    PROCEDURES:    PLAN:     DIAGNOSIS:  1. Left sciatic nerve pain        MEDICATIONS & ORDERS:  Orders Placed This Encounter    Ambulatory referral/consult to Physical/Occupational Therapy    varicella-zoster [id number], PF, (SHINGRIX, PF,) 50 mcg/0.5 mL injection         FOLLOW-UP: With me 4 weeks    Joel Kent Jr, MD, FACOG

## 2023-01-23 ENCOUNTER — PATIENT MESSAGE (OUTPATIENT)
Dept: OBSTETRICS AND GYNECOLOGY | Facility: CLINIC | Age: 33
End: 2023-01-23
Payer: COMMERCIAL

## 2023-01-24 ENCOUNTER — PATIENT MESSAGE (OUTPATIENT)
Dept: OBSTETRICS AND GYNECOLOGY | Facility: CLINIC | Age: 33
End: 2023-01-24
Payer: COMMERCIAL

## 2023-02-02 ENCOUNTER — PATIENT MESSAGE (OUTPATIENT)
Dept: OBSTETRICS AND GYNECOLOGY | Facility: CLINIC | Age: 33
End: 2023-02-02
Payer: COMMERCIAL

## 2023-02-06 PROBLEM — Z98.891 STATUS POST PRIMARY LOW TRANSVERSE CESAREAN SECTION: Status: RESOLVED | Noted: 2023-01-03 | Resolved: 2023-02-06

## 2023-02-06 PROBLEM — D69.6 GESTATIONAL THROMBOCYTOPENIA: Status: RESOLVED | Noted: 2023-01-06 | Resolved: 2023-02-06

## 2023-02-06 PROBLEM — O99.119 GESTATIONAL THROMBOCYTOPENIA: Status: RESOLVED | Noted: 2023-01-06 | Resolved: 2023-02-06

## 2023-02-06 PROBLEM — D64.9 ANEMIA: Status: RESOLVED | Noted: 2022-10-18 | Resolved: 2023-02-06

## 2023-02-06 PROBLEM — O35.EXX0 ENCOUNTER FOR REPEAT ULTRASOUND OF FETAL PYELECTASIS, ANTEPARTUM: Status: RESOLVED | Noted: 2022-09-22 | Resolved: 2023-02-06

## 2023-02-06 RX ORDER — CYCLOBENZAPRINE HCL 10 MG
10 TABLET ORAL 3 TIMES DAILY PRN
Qty: 30 TABLET | Refills: 1 | Status: SHIPPED | OUTPATIENT
Start: 2023-02-06 | End: 2023-02-16

## 2023-02-17 ENCOUNTER — CLINICAL SUPPORT (OUTPATIENT)
Dept: REHABILITATION | Facility: OTHER | Age: 33
End: 2023-02-17
Attending: OBSTETRICS & GYNECOLOGY
Payer: COMMERCIAL

## 2023-02-17 DIAGNOSIS — R29.898 DECREASED STRENGTH OF TRUNK AND BACK: ICD-10-CM

## 2023-02-17 DIAGNOSIS — M54.32 LEFT SCIATIC NERVE PAIN: ICD-10-CM

## 2023-02-17 PROCEDURE — 97161 PT EVAL LOW COMPLEX 20 MIN: CPT

## 2023-02-17 PROCEDURE — 97110 THERAPEUTIC EXERCISES: CPT

## 2023-02-17 NOTE — PLAN OF CARE
[facility]  Physical Therapy Initial Evaluation  Date: 2023   Name: Michelle Hernandez  Clinic Number: [id number]    Therapy Diagnosis:   Encounter Diagnosis   Name Primary?    Left sciatic nerve pain      Physician: Joel Kent Jr., MD    Physician Orders: PT Eval and Treat   Medical Diagnosis from Referral: M54.32 (ICD-10-CM) - Left sciatic nerve pain  Evaluation Date: 2023  Authorization Period Expiration: 2024  Plan of Care Expiration: 2023  Visit # / Visits authorized:    Progress Note Due: 3/17/2023  FOTO:     Precautions: Standard    Time In: 0915  Time Out: 1015  Total Appointment Time (timed & untimed codes): 60 minutes    Subjective   Date of onset: 2023     History of current condition - Michelle reports to clinic for evaluation of low back pain that started after giving birth. Gave birth on  of this year. Back pain is primarily L sided. The pain shoots down from the back down into the left. It was most painful a week after giving birth, there was sharp pains and she was unable to move or walk on her own. Went to ED and was given Flexeril which helped but she still continues to get the shooting pain when she goes to sit down or slouch forward. She had an unplanned , reports that the pain is in the general area of where she got the epidural. Also reports some numbness and tingling into the right hand that started after giving birth as well. Started with 3rd and 4th digit but is now the entire hand.     FAHEEM: Started after giving birth in January  Any Bowel and Bladder movement issues: No  Any falls: No  Any dizziness: No  Any Headache: No  Any injection in lower back: steroid or epidural: Epidural for birth   Pain radiates: Into L gluteal   Pain constant or intermitting: Intermittent     Pain:  Current 5/10, worst 10/10, best 0/10   Location:  L sided low back and gluteal   Description: Aching and sore  Aggravating Factors:  "Sitting, Bending, Morning, and Lifting  Easing Factors: lying down and rest    Prior Therapy: No  Prior Treatment: None  Social History: Lives with partner and ; mom currently staying with them to assist.   Occupation: Works in HR; Sitting down most of the day   Prior Level of Function: Independent with mobility and recreation.   Current Level of Function: Independent with increased pain;     Pts goals: "Get the pain in the nerve to go away"     Imaging, None    Medical History:   Past Medical History:   Diagnosis Date    Abnormal Pap smear of cervix     Herpes simplex virus (HSV) infection      Surgical History:   Michelle Hernandez  has a past surgical history that includes  section (N/A, 2023).    Medications:   Michelle has a current medication list which includes the following prescription(s): docusate sodium, ferrous sulfate, hydrocodone-acetaminophen, ibuprofen, oxycodone-acetaminophen, polyethylene glycol, and valacyclovir.    Allergies:   Review of patient's allergies indicates:   Allergen Reactions    Shellfish derived Anaphylaxis     Shrimp, lobster, itchy, throat swelling      Objective   Observation  Sitting: Fair  Standing: Fair  Correction of posture: better with lumbar roll    Posture Alignment: Slightly rounded shoulders; decreased lordosis    MOVEMENT LOSS    ROM Loss   Flexion minimal loss   Extension moderate loss   Side glide Right minimal loss   Side glide Left minimal loss   Rotation Right minimal loss   Rotation Left minimal loss     Passive hip ROM: WNL    Lower Extremity Strength    Right LE  Left LE    Hip flexion: 4/5 Hip flexion: 4/5   Knee extension: 5/5 Knee extension: 5/5   Knee flexion: 5/5 Knee flexion: 5/5   Hip IR: 4/5 Hip IR: 4/5   Hip ER: 4/5 Hip ER: 4/5   Hip extension:  4-/5 Hip extension: 4-/5   Hip abduction: 4/5 Hip abduction: 4/5   Hip adduction: 4+/5 Hip adduction 4+/5   Ankle dorsiflexion: 5/5 Ankle dorsiflexion: 5/5   Ankle plantarflexion: 5/5 Ankle " plantarflexion: 5/5     GAIT:  Assistive Device used: none  Level of Assistance: independent  Patient displays the following gait deviations:  no gait deviations observed.     Special Tests:   Test Name  Test Result   SI Joint Provocation Test (--)   Straight Leg Raise (--)   Neural Tension Test (--)   Walking on toes (--)   Walking on heels  (--)       NEUROLOGICAL SCREENING     Sensory deficit: Intact and equal bilaterally     Reflexes:    Left Right   Patella Tendon 1+ 1+   Achilles Tendon 1+ 1+   Clonus (--) (--)     REPEATED TEST MOVEMENTS:    Baseline symptoms:  Repeated Flexion in Standing no effect   Repeated Extension in Standing end range pain   Repeated Flexion in lying no effect   Repeated Extension in lying  better     STATIC TESTS and other movements:   Baseline symptoms:  Prone lie better   Prone lie on elbows better     Special Tests:  -JAN: negative  -Scour: negative  -Piriformis Test: negative  -Prone Instability Test: positive  -Trendelenburg test :positive for glute med inhibition    Neuro Dynamic Testing:    Sciatic nerve:      SLR: negative   Femoral Nerve:    Femoral nerve test: negative   Neural Tension:     Slump test: negative    Joint Mobility: Sacral grade II/III PA firm end feel no increase in symptoms; Lumbar central PA grade II/III firm end feel no increase in symptoms; Sacral distraction no increase in symptoms,        Palpation: No tenderness noted to piriformis; PSIS; or lumbar paraspinals    Flexibility: Hamstrings WNL; Hip Flexor WNL    PT Evaluation Completed? Yes  Discussed Plan of Care with patient: Yes    CMS Impairment/Limitation/Restriction for FOTO Lumbar Survey    Therapist reviewed FOTO scores for Michelle Hernandez on 2/17/2023.   FOTO documents entered into [employer] - see Media section.    Limitation Score: 40%  Goal: 27%           TREATMENT   Treatment Time In: 0950  Treatment Time Out: 1015  Total Treatment time separate from Evaluation: 25 minutes    Michelle received  therapeutic exercises to develop core stabilization for 20 minutes including:    Prone press ups 10x  Prone on elbows 2min  Posterior pelvic tilt 10x  Supine chin tuck 10x 3s hold  Cervical extension over plinth 1 min    Michelle received cold pack for 5 minutes to lumbar spine in prone.    Home Exercises and Patient Education Provided  Prone press ups 10x  Prone on elbows 2min  Posterior pelvic tilt 10x  Supine chin tuck 10x 3s hold  Cervical extension over plinth 1 min    Education provided:   Patient education on anatomy and physiology of low back pain and neural tension. Discussed possible causes of numbness and tingling down into R hand. Education on importance of activity and core stabilization exercises for minimizing back pain. Discussed scar mobilization techniques for  scar. Instructed in home exercise program.     Written Home Exercises Provided: yes.  Exercises were reviewed and Michelle was able to demonstrate them prior to the end of the session.  Michelle demonstrated good  understanding of the education provided.     See EMR under Patient Instructions for exercises provided 2023.    Assessment   Michelle is a 32 y.o. female referred to outpatient Physical Therapy with a medical diagnosis of M54.32 (ICD-10-CM) - Left sciatic nerve pain. Pt presents for initial evaluation with complaints of low back pain that is primarily left sided and radiates down into left gluteal. She also has numbness and tingling into R hand and fingers that have all started after giving birth on 1/3/2023. During testing she had weakness in bilateral hips and core, had difficulty with straight leg raise with TA activation, decreased multifidi activation with extension in prone. Glute medius inhibition with trendelenburg testing. Responded well to extension based exercises for centralization of pain. With upper limb tension testing, had increase in symptoms with median nerve tension test and relief of symptoms with cervical distraction and  rotation away from affected side.     Pt prognosis is Excellent.   Pt will benefit from skilled outpatient Physical Therapy to address the deficits stated above and in the chart below, provide pt/family education, and to maximize pt's level of independence.     Plan of care discussed with patient: Yes  Pt's spiritual, cultural and educational needs considered and patient is agreeable to the plan of care and goals as stated below:     Anticipated Barriers for therapy: None    Medical Necessity is demonstrated by the following  History  Co-morbidities and personal factors that may impact the plan of care Co-morbidities:   prior abdominal surgery    Personal Factors:   no deficits     low   Examination  Body Structures and Functions, activity limitations and participation restrictions that may impact the plan of care Body Regions:   back  lower extremities  upper extremities  trunk    Body Systems:    ROM  strength  motor control    Participation Restrictions:   None    Activity limitations:   Learning and applying knowledge  no deficits    General Tasks and Commands  no deficits    Communication  no deficits    Mobility  no deficits    Self care  no deficits    Domestic Life  no deficits    Interactions/Relationships  no deficits    Life Areas  no deficits    Community and Social Life  recreation and leisure         Complexity: low  See FOTO outcome assessment   Clinical Presentation stable and uncomplicated low   Decision Making/ Complexity Score: low     GOALS: Short Term Goals:  4 weeks  1. Report decreased in pain at worse less than  <   / =  4  /10  to increase tolerance for functional activities.On going  2. Increased Hip flexion MMT 1/2  to increase tolerance for ADL and work activities.On going  3. Pt to tolerate HEP to improve ROM and independence with ADL's. On going    Long Term Goals: 8 weeks  1. Report decreased in pain at worse less than  <   / =  2  /10  to increase tolerance for functional mobility.   On going  2. Increased general hip MMT 1 grade to increase tolerance for ADL and work activities.On going  3. Pt will report at 27% or less limitation on FOTO Lumbar spine survey  to demonstrate decrease in disability and improvement in back pain.On going  4. Pt to be Independent with HEP to improve ROM and independence with ADL's. On going  5. Pt will be able to lift 15# with no increase in symptoms to simulate lifting child at home. On going      Plan   Plan of care Certification: 2/17/2023 to 5/17/2023.    Outpatient Physical Therapy 2 times weekly for 8 weeks to include the following interventions: Manual Therapy, Moist Heat/ Ice, Neuromuscular Re-ed, Patient Education, Self Care, Therapeutic Activities, and Therapeutic Exercise, Dry needling as needed     Aruna Cano, PT      I CERTIFY THE NEED FOR THESE SERVICES FURNISHED UNDER THIS PLAN OF TREATMENT AND WHILE UNDER MY CARE   Physician's comments:     Physician's Signature: ___________________________________________________

## 2023-03-01 ENCOUNTER — CLINICAL SUPPORT (OUTPATIENT)
Dept: REHABILITATION | Facility: OTHER | Age: 33
End: 2023-03-01
Attending: FAMILY MEDICINE
Payer: COMMERCIAL

## 2023-03-01 DIAGNOSIS — R29.898 DECREASED STRENGTH OF TRUNK AND BACK: Primary | ICD-10-CM

## 2023-03-01 PROCEDURE — 97110 THERAPEUTIC EXERCISES: CPT

## 2023-03-01 PROCEDURE — 97530 THERAPEUTIC ACTIVITIES: CPT

## 2023-03-01 NOTE — PROGRESS NOTES
[facility]   Physical Therapy Treatment Note     Name: Michelle Plunkett Kaufman  Clinic Number: [id number]    Therapy Diagnosis: No diagnosis found.  Physician: Joel Kent Jr., MD    Visit Date: 3/1/2023    Physician Orders: PT Eval and Treat  Medical Diagnosis: M54.32 (ICD-10-CM) - Left sciatic nerve pain  Evaluation Date: 2/17/2023  Authorization Period Expiration: 01/17/2024  Plan of Care Certification Period: 05/17/2023  Visit # / Visits authorized: 1/ 1     Progress Note Due: 3/17/2023  FOTO: 1/ 1  PTA Visit #: 0/5     Precautions: Standard    Time In: 11:05 a  Time Out: 12:00 p  Total Billable Time: 55 minutes      SUBJECTIVE     Pt reports: She is feeling better since the last visit, reporting 4/10 L sided low back and gluteal pain.   She was compliant with home exercise program.  Response to previous treatment: decreased pain.  Functional change: none yet    Pain: 4/10  Location: left back      OBJECTIVE     Objective Measures updated at progress report unless specified.       TREATMENT     Michelle received the treatments listed below:      received therapeutic exercises to develop strength and ROM for 45 minutes including:     TM 5'  Prone press ups 2x10  Prone on elbows 2min  Posterior pelvic tilt 10x  Open book x10 ea.   Supine chin tuck 2x10 3s hold  Cervical extension over plinth 1 min  Bridges hook-lying 2x10 RTB   Clamshells RTB x15   Supine hip add with ball 2x10  Hip hikes x10 ea. (Verbal and tactile cueing)  S/L hip ABD x10    Received therapeutic activities to improve function mobility for 10 minutes including:  - Pt education on physiological effects on ligaments during pregnancy and the importance of strengthening core and hip muscles to decrease instability and pain.     Michelle received cold pack for 0 minutes to lumbar spine in prone.      PATIENT EDUCATION AND HOME EXERCISES     Home Exercises Provided and Patient Education Provided     Education provided:   - HEP and  POC    Written Home Exercises Provided: Patient instructed to cont prior HEP. Exercises were reviewed and Michelle was able to demonstrate them prior to the end of the session.  Michelle demonstrated good  understanding of the education provided. See EMR under Patient Instructions for exercises provided during therapy sessions    ASSESSMENT     Pt tolerated treatment well today. Core and hip strengthening exercises were incorporated to improve strength and stability; pt required verbal and tactile cueing to engage TA during bridges, hip ADD, and clamshells. Continued to incorporate extension exercises to centralize pain. Pt demonstrated good understanding on the difference between muscle fatigue and radiating pain. Pt will continue to progress POC as tolerated.     Michelle Is progressing well towards her goals.   Pt prognosis is Excellent.     Pt will continue to benefit from skilled outpatient physical therapy to address the deficits listed in the problem list box on initial evaluation, provide pt/family education and to maximize pt's level of independence in the home and community environment.     Pt's spiritual, cultural and educational needs considered and pt agreeable to plan of care and goals.     Anticipated barriers to physical therapy: None    GOALS:   Short Term Goals:  4 weeks  1. Report decreased in pain at worse less than  <   / =  4  /10  to increase tolerance for functional activities.On going  2. Increased Hip flexion MMT 1/2  to increase tolerance for ADL and work activities.On going  3. Pt to tolerate HEP to improve ROM and independence with ADL's. On going     Long Term Goals: 8 weeks  1. Report decreased in pain at worse less than  <   / =  2  /10  to increase tolerance for functional mobility.  On going  2. Increased general hip MMT 1 grade to increase tolerance for ADL and work activities.On going  3. Pt will report at 27% or less limitation on FOTO Lumbar spine survey  to demonstrate decrease in disability and  improvement in back pain.On going  4. Pt to be Independent with HEP to improve ROM and independence with ADL's. On going  5. Pt will be able to lift 15# with no increase in symptoms to simulate lifting child at home. On going       PLAN     Plan of care Certification: 2/17/2023 to 5/17/2023.     Outpatient Physical Therapy 2 times weekly for 8 weeks to include the following interventions: Manual Therapy, Moist Heat/ Ice, Neuromuscular Re-ed, Patient Education, Self Care, Therapeutic Activities, and Therapeutic Exercise, Dry needling as needed       Shon Burrell, PT

## 2023-03-30 ENCOUNTER — POSTPARTUM VISIT (OUTPATIENT)
Dept: OBSTETRICS AND GYNECOLOGY | Facility: CLINIC | Age: 33
End: 2023-03-30
Payer: COMMERCIAL

## 2023-03-30 VITALS — BODY MASS INDEX: 23.62 KG/M2 | DIASTOLIC BLOOD PRESSURE: 58 MMHG | WEIGHT: 125 LBS | SYSTOLIC BLOOD PRESSURE: 97 MMHG

## 2023-03-30 DIAGNOSIS — B16.1 ACUTE HEPATITIS B WITH DELTA-AGENT WITHOUT HEPATIC COMA: ICD-10-CM

## 2023-03-30 PROCEDURE — 0503F POSTPARTUM CARE VISIT: CPT | Mod: S$GLB,,, | Performed by: OBSTETRICS & GYNECOLOGY

## 2023-03-30 PROCEDURE — 0503F PR POSTPARTUM CARE VISIT: ICD-10-PCS | Mod: S$GLB,,, | Performed by: OBSTETRICS & GYNECOLOGY

## 2023-03-30 PROCEDURE — 99999 PR PBB SHADOW E&M-EST. PATIENT-LVL III: ICD-10-PCS | Mod: PBBFAC,,, | Performed by: OBSTETRICS & GYNECOLOGY

## 2023-03-30 PROCEDURE — 99999 PR PBB SHADOW E&M-EST. PATIENT-LVL III: CPT | Mod: PBBFAC,,, | Performed by: OBSTETRICS & GYNECOLOGY

## 2023-03-30 NOTE — PROGRESS NOTES
PT HERE PP.  WANTS TO STOP PUMPING.  ? BC.  NEEDS HEPATOLOGY.    ROS:  GENERAL: No fever, chills, fatigability or weight loss.  VULVAR: No pain, no lesions and no itching.  VAGINAL: No relaxation, no itching, no discharge, no abnormal bleeding and no lesions.  ABDOMEN: No abdominal pain. Denies nausea. Denies vomiting. No diarrhea. No constipation  BREAST: Denies pain. No lumps. No discharge.  URINARY: No incontinence, no nocturia, no frequency and no dysuria.  CARDIOVASCULAR: No chest pain. No shortness of breath. No leg cramps.  NEUROLOGICAL: No headaches. No vision changes.  The remainder of the review of systems was negative.    PE:  General Appearance: normal weight And Well developed. Well nourished. In no acute distress.  Abdomen: normal weight No masses. No tenderness. HEALED INCISION      PROCEDURES:    PLAN:     DIAGNOSIS:  1. Routine postpartum follow-up    2. 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 She was counseled to follow up with her PCP for other routine health maintenance. Counseling session lasted approximately 10 minutes, and all her questions were answered.         FOLLOW-UP: With me in 12 month    Joel Kent Jr, MD, FACOG

## 2023-04-25 ENCOUNTER — TELEPHONE (OUTPATIENT)
Dept: HEPATOLOGY | Facility: CLINIC | Age: 33
End: 2023-04-25
Payer: COMMERCIAL

## 2023-04-25 NOTE — TELEPHONE ENCOUNTER
Contacted patient to reschedule appointment due to provider not available. Patient would like to cancel appointment because she will be out of town and she don't know when will she be available.

## 2023-04-25 NOTE — TELEPHONE ENCOUNTER
Referral to hepatology for Hep B    Scheduling attempt # 2 (recently appt had to be cancelled)    Please call pt to schedule appt with any RACH  Encourage video visits for new patient appts with APPs

## 2023-05-03 NOTE — TELEPHONE ENCOUNTER
Pt was called in attempt to make apt. Pt states she is not interested in making appointment because in upcoming months she will be moving to a different state.    Scheduling attempt #3